# Patient Record
Sex: MALE
[De-identification: names, ages, dates, MRNs, and addresses within clinical notes are randomized per-mention and may not be internally consistent; named-entity substitution may affect disease eponyms.]

---

## 2019-03-15 PROBLEM — Z00.00 ENCOUNTER FOR PREVENTIVE HEALTH EXAMINATION: Status: ACTIVE | Noted: 2019-03-15

## 2019-03-25 ENCOUNTER — APPOINTMENT (OUTPATIENT)
Dept: ORTHOPEDIC SURGERY | Facility: CLINIC | Age: 54
End: 2019-03-25
Payer: COMMERCIAL

## 2019-03-25 VITALS
BODY MASS INDEX: 42.66 KG/M2 | WEIGHT: 315 LBS | HEIGHT: 72 IN | HEART RATE: 76 BPM | DIASTOLIC BLOOD PRESSURE: 78 MMHG | SYSTOLIC BLOOD PRESSURE: 155 MMHG

## 2019-03-25 DIAGNOSIS — Z86.39 PERSONAL HISTORY OF OTHER ENDOCRINE, NUTRITIONAL AND METABOLIC DISEASE: ICD-10-CM

## 2019-03-25 DIAGNOSIS — Z78.9 OTHER SPECIFIED HEALTH STATUS: ICD-10-CM

## 2019-03-25 DIAGNOSIS — Z83.3 FAMILY HISTORY OF DIABETES MELLITUS: ICD-10-CM

## 2019-03-25 PROCEDURE — 99204 OFFICE O/P NEW MOD 45 MIN: CPT

## 2019-03-25 PROCEDURE — 73564 X-RAY EXAM KNEE 4 OR MORE: CPT | Mod: LT

## 2019-03-25 RX ORDER — HYALURONATE SODIUM 20 MG/2 ML
20 SYRINGE (ML) INTRAARTICULAR
Qty: 1 | Refills: 0 | Status: ACTIVE | OUTPATIENT
Start: 2019-03-25

## 2019-03-25 RX ORDER — DICLOFENAC SODIUM 50 MG/1
50 TABLET, DELAYED RELEASE ORAL
Qty: 60 | Refills: 1 | Status: ACTIVE | COMMUNITY
Start: 2019-03-25 | End: 1900-01-01

## 2019-03-25 RX ORDER — METFORMIN HYDROCHLORIDE 625 MG/1
TABLET ORAL
Refills: 0 | Status: ACTIVE | COMMUNITY

## 2019-03-25 NOTE — HISTORY OF PRESENT ILLNESS
[de-identified] : CC Left knee\par \par HPI 55 yo male right HD presents with [chronic] onset of 3 months of constant pain in the posterior lateral and anterior Left knee [without injury]. The pain is same, and rated a 6 out of 10, described as sharp, with radiation to the he'll. [Rest] makes the pain better and [walking standing stairs] makes the pain worse. The patient reports associated symptoms of pop and click. The patient - pain at night affecting sleep, and - similar pain previously.\par \par The patient has tried the following treatments:\par Activity modification	+\par Ice/Compression  	+\par Braces    		-\par Nsaids    		+\par Physical Therapy 	-\par Cortisone Injection	-\par Visco Injection		-\par Arthroscopy		-\par \par Review of Systems is positive for the above musculoskeletal symptoms and is otherwise non-contributory for general, constitutional, psychiatric, neurologic, HEENT, cardiac, respiratory, gastrointestinal, reproductive, lymphatic, and dermatologic complaints.\par \par Consult by Dr Nicole Grossman\par \par

## 2019-03-25 NOTE — PHYSICAL EXAM
[de-identified] : Physical Examination\par General: well nourished, in no acute distress, alert and oriented to person, place and time\par Psychiatric: normal mood and affect, no abnormal movements or speech patterns\par Eyes: vision intact - glasses\par Throat: no thyromegaly\par Lymph: no enlarged nodes, no lymphedema in extremity\par Respiratory: no wheezing, no shortness of breath with ambulation\par Cardiac: no cardiac leg swelling, 2+ peripheral pulses\par Neurology: normal gross sensation in extremities to light touch\par Abdomen: soft, non-tender, tympanic, no masses\par \par Musculoskeletal Examination\par Ambulation	+ antalgic gait, - assistive devices\par \par Knee			Right			Left\par General\par      Swelling/Deformity	normal			normal	\par      Skin			normal			normal\par      Erythema		-			-\par      Standing Alignment	neutral			varus\par      Effusion		none			trace\par Range of Motion\par      Hip			full painless ROM		full painless ROM\par      Knee Flexion		100			70\par      Knee Extension	0			0\par Patella\par      J Sign		-			-\par      Quad Medial/Lateral	1/1 1/1\par      Apprehension		-			-\par      Jr's		-			+\par      Grind Sign		-			+\par      Crepitus		-			+\par Palpation\par      Medial Joint Line	-			+\par      Medial Fem Condyle	-			-\par      Lateral Joint Line	-			-\par      Quad Tendon		-			-\par      Patella Tendon	-			-\par      Medial Patella		-			-\par      Lateral Patella 	-			-\par      Posterior Knee	-			+\par Ligamentous\par      Varus @ 0° / 30°	-/-			-/-\par      Valgus @ 0° / 30°	-/-			-/-\par      Lachman		-			-\par      Pivot Shift		-			-\par      Anterior Drawer	-			-\par      Posterior Drawer	-			-\par Meniscus\par      Luis		-			+\par      Flexion Pinch		-			posterior pain\par Strength Examination/Atrophy\par      Hip Flexors 		5+			5+\par      Quadriceps		5+			5+\par      Hamstring		5+			5+\par      Tibialis Anterior	5+			5+\par      Achilles/Soleus	5+			5+\par Sensation\par      Deep Peroneal	normal			normal\par      Superficial Peroneal 	normal			normal\par      Sural  		normal			normal\par      Posterior Tibial 	normal			normal\par      Saphneous 		normal			normal\par Pulses\par      DP			2+			2+\par \par \par  [de-identified] : 5 views of the affected Left knee (standing AP, flexing standing AP, 30degree flexed lateral, 0degree lateral, sunrise view)\par were ordered, obtained and evaluated by myself today and\par demonstrate:\par There is severe medial weightbearing asymmetric narrowing\par Small to moderate osteophytic lipping\par Small suprapatellar effusion\par Mild patellofemoral joint space loss without evidence of tilt [or] subluxation on sunrise view\par Normal soft tissue density\par Otherwise normal osseous bone structure without fracture or dislocation

## 2019-04-22 ENCOUNTER — APPOINTMENT (OUTPATIENT)
Dept: ORTHOPEDIC SURGERY | Facility: CLINIC | Age: 54
End: 2019-04-22

## 2019-06-10 ENCOUNTER — APPOINTMENT (OUTPATIENT)
Dept: ORTHOPEDIC SURGERY | Facility: CLINIC | Age: 54
End: 2019-06-10
Payer: COMMERCIAL

## 2019-06-10 PROCEDURE — 20611 DRAIN/INJ JOINT/BURSA W/US: CPT | Mod: LT

## 2019-06-10 PROCEDURE — 99214 OFFICE O/P EST MOD 30 MIN: CPT | Mod: 25

## 2019-06-10 NOTE — PHYSICAL EXAM
[de-identified] : 5 views of the affected Left knee (standing AP, flexing standing AP, 30degree flexed lateral, 0degree lateral, sunrise view)\par 3-2019\par demonstrate:\par There is severe medial weightbearing asymmetric narrowing\par Small to moderate osteophytic lipping\par Small suprapatellar effusion\par Mild patellofemoral joint space loss without evidence of tilt [or] subluxation on sunrise view\par Normal soft tissue density\par Otherwise normal osseous bone structure without fracture or dislocation\par \par \par MRI Left knee from [facility] on 4-8-19\par My impression of the images:\par Quality of the MRI is poor\par Medial Meniscus extruded and degenerative tear\par Lateral Meniscus ok\par There is severe chondral loss in the medial greater than patellofemoral greater than compartments\par There is bone marrow edema[/subchondral cysts] in the medial and patellofemoral compartments\par LCL is intact\par MCL is intact\par ACL is intact\par PCL is intact \par Quadriceps Tendon is intact\par Patella Tendon is intact\par \par The Final Radiologist Impression:\par There is moderate to severe medial compartment arthrosis with severe joint space narrowing, broad area of full-thickness articular cartilage loss of the central weightbearing surface of the femoral condyle and tibial plateau with reactive marrow edema and marginal osteophytes. The medial meniscus is diffusely degenerated and torn and there is extrusion of the body segment.\par The lateral meniscus is intact without degenerative signal. There is mucoid degeneration of the anterior and posterior cruciate ligaments. The medial and lateral collateral ligament complex are intact. The extensor mechanism is intact.\par There is a lobulated intraosseous ganglion within the femoral attachment of the anterior cruciate ligament. There is heterogeneous marrow signal within the distal femur without infiltrative lesion. The articular cartilage of the lateral and patellofemoral compartments is maintained. There is a small joint effusion. There is no significant popliteal cyst. The para-articular musculature is unremarkable. There is subcutaneous soft tissue edema.\par IMPRESSION:\par Moderate to severe medial compartment arthrosis with diffusely degenerated and torn medial meniscus and extrusion of the body segment.\par \par  [de-identified] : Physical Examination\par General: well nourished, in no acute distress, alert and oriented to person, place and time\par Psychiatric: normal mood and affect, no abnormal movements or speech patterns\par Eyes: vision intact - glasses\par Throat: no thyromegaly\par Lymph: no enlarged nodes, no lymphedema in extremity\par Respiratory: no wheezing, no shortness of breath with ambulation\par Cardiac: no cardiac leg swelling, 2+ peripheral pulses\par Neurology: normal gross sensation in extremities to light touch\par Abdomen: soft, non-tender, tympanic, no masses\par \par Musculoskeletal Examination\par Ambulation	+ antalgic gait, - assistive devices\par \par Knee			Right			Left\par General\par      Swelling/Deformity	normal			normal	\par      Skin			normal			normal\par      Erythema		-			-\par      Standing Alignment	neutral			varus\par      Effusion		none			trace\par Range of Motion\par      Hip			full painless ROM		full painless ROM\par      Knee Flexion		100			70\par      Knee Extension	0			0\par Patella\par      J Sign		-			-\par      Quad Medial/Lateral	1/1 1/1\par      Apprehension		-			-\par      Jr's		-			+\par      Grind Sign		-			+\par      Crepitus		-			+\par Palpation\par      Medial Joint Line	-			+\par      Medial Fem Condyle	-			-\par      Lateral Joint Line	-			-\par      Quad Tendon		-			-\par      Patella Tendon	-			-\par      Medial Patella		-			-\par      Lateral Patella 	-			-\par      Posterior Knee	-			+\par Ligamentous\par      Varus @ 0° / 30°	-/-			-/-\par      Valgus @ 0° / 30°	-/-			-/-\par      Lachman		-			-\par      Pivot Shift		-			-\par      Anterior Drawer	-			-\par      Posterior Drawer	-			-\par Meniscus\par      Luis		-			+\par      Flexion Pinch		-			posterior pain\par Strength Examination/Atrophy\par      Hip Flexors 		5+			5+\par      Quadriceps		5+			5+\par      Hamstring		5+			5+\par      Tibialis Anterior	5+			5+\par      Achilles/Soleus	5+			5+\par Sensation\par      Deep Peroneal	normal			normal\par      Superficial Peroneal 	normal			normal\par      Sural  		normal			normal\par      Posterior Tibial 	normal			normal\par      Saphneous 		normal			normal\par Pulses\par      DP			2+			2+\par \par \par

## 2019-06-10 NOTE — HISTORY OF PRESENT ILLNESS
[de-identified] : CC Left knee\par \par HPI 55 yo male right HD presents for MRI FU of [chronic] onset of 3 months of constant pain in the posterior lateral and anterior Left knee [without injury]. The pain is same, and rated a 6 out of 10, described as sharp, with radiation to the he'll. [Rest] makes the pain better and [walking standing stairs] makes the pain worse. The patient reports associated symptoms of pop and click. The patient - pain at night affecting sleep, and - similar pain previously.\par \par The patient has tried the following treatments:\par Activity modification	+\par Ice/Compression  	+\par Braces    		-\par Nsaids    		+\par Physical Therapy 	+\par Cortisone Injection	-\par Visco Injection		not covered by insurance\par Arthroscopy		-\par \par Review of Systems is positive for the above musculoskeletal symptoms and is otherwise non-contributory for general, constitutional, psychiatric, neurologic, HEENT, cardiac, respiratory, gastrointestinal, reproductive, lymphatic, and dermatologic complaints.\par \par Consult by Dr Nicole Grossman\par \par

## 2019-06-10 NOTE — DISCUSSION/SUMMARY
[de-identified] : Left knee osteoarthritis\par Complex medial meniscus tear with extrusion\par \par The patient and I discussed the causes and progression of degenerative joint disease of the knee. Models, diagrams and drawings were used in the discussion. Treatment can include conservative non-operative management and surgical options. Conservative management includes weight loss, activity modification, physical therapy to improve motion and strength in the muscles around the knee and the body's core, PO and topical NSAIDs, corticosteroid and/or viscosupplementation intra-articular injections. If the patient fails to improve with non-operative management, surgical management is possible. Depending upon the patient's age, BMI, activity level, degree and location of arthrosis different surgical options are possible including arthroscopic debridement with chondroplasty, high-tibial osteotomy, unicondylar/partial arthroplasty, and total joint arthroplasty.\par \par Physical therapy was prescribed for knee ROM exercises, strengthening exercises, deep tissue massage, core strengthening, hip abductor strengthening, VMO strengthening, modalities PRN, and home exercises\par \par Procedure Note:\par \par Verbal consent was obtained for an arthrocentesis and intra-articular ketorolac injection of the Left knee, after the risks and benefits were discussed with the patient. Potential adverse effects were discussed including but not limited to bleeding, skin/joint infection, local skin reactions including bleaching, bruising, stiffness, soreness, vasovagal episodes, transient hyperglycemia, avascular necrosis, pseudo-septic type reactions, post injection joint pain, allergic reaction to product or anesthetic and other rare but potential adverse effects along with benefits including decreased pain and improved stability prior to obtaining verbal informed consent. It was also discussed that for some patients the treatment is ineffective and there are no guarantees that the patient will experience improvement as the result of the injection. In rare occasions the injection can cause worsening of pain.\par \par The use of a Sonosite 15-6 MHz linear transducer with live ultrasound guidance of the knee was necessary given the patient's BMI and local body habitus overlying and obscuring the accurate identification of normal body bony anatomy used to identify the injection site and the depth of soft tissue envelope necessitating a longer than normal needle to reach the joint space, and to confirm the location of the needle tip and intra-articular delivery of the medication. Without the use of live ultrasound guidance the injection would have been more difficult and place the patient's neurovascular structures at risk from the longer needle needed to traverse the soft tissue envelope.\par \par A 6 inch bolster was placed underneath the knee to place the knee in a slightly flexed position. The superolateral injection site was identified using the ultrasound probe to identify the suprapatellar space and superior boarder of the patella first longitudinally and then transversely. The injection site was marked and prepped with a ChloraPrep swab and anesthetized with ethylchloride skin anesthesia. Using sterile technique a 20g 3 1/2 in spinal needle with 4 cc total of 1cc 1% lidocaine without epinephrine, 1cc 0.25% Marcaine without epinephrine and 2cc of 60mg/mL Ketorolac was passed through the injection site towards the suprapatellar space under live ultrasound guidance and noted to penetrate the joint capsule. The medication was injected without resistance under live ultrasound visualization and noted to flow into the suprapatellar joint space. The injection site was sterilely dressed, there was minimal blood loss. The patient tolerated this procedure without any complications done by myself. Images were recorded and saved.\par \par The patient has been advised that if they notice any worsening of symptoms or any problems to contact me and seek care from a qualified medical professional. The patient was instructed to ice the knee and take NSAID medication on an as needed basis if the patient feels discomfort.\par \par Due to failure of prior non-operative modalities of treatment including physical therapy, activity modification, non-steroidal anti-inflammatory medications, and weight-loss with inability to give corticosteroid injection due to the patient's diabetic status over concern for unsafe rise in blood glucose causing injury to the patient, I am ordering a viscosupplementation injection Zilretta left knee and will obtain insurance authorization. The patient will be contacted by our authorization department over its status, copayment and when available for injection.\par Patient has an MRI approved by his PMD, I recommend he continues and obtains the PMD ordered mri and followup afterwards with a CD for my evaluation of the images\par \par The patient verifies their understanding the the visit, diagnosis and plan. They agree with the treatment plan and will contact the office with any questions or problems.\par Follow up\par PRN

## 2019-07-08 ENCOUNTER — APPOINTMENT (OUTPATIENT)
Dept: ORTHOPEDIC SURGERY | Facility: CLINIC | Age: 54
End: 2019-07-08
Payer: COMMERCIAL

## 2019-07-08 PROCEDURE — 20611 DRAIN/INJ JOINT/BURSA W/US: CPT | Mod: LT

## 2019-07-08 NOTE — PROCEDURE
[de-identified] : Chief complaint:     Left knee pain\par \par Diagnosis:              Left knee osteoarthritis\par \par \par Injection:\par Zilretta\par 262104 expiration 10-20\par \par \par Procedure Note:\par \par Risks and benefits of an arthrocentesis and intraarticular delayed release cortisone injection of the LEFT knee were discussed with the patient. Potential adverse effects were discussed including but not limited to bleeding, skin/ joint infection, local skin reactions including bleaching, bruising, stiffness, soreness, vasovagal episodes, transient hyperglycemia, avascular necrosis, pseudo-septic type reactions, post injection joint pain, allergic reaction to product or anesthetic and other rare but potential adverse effects along with benefits including decreased pain and improved stability prior to obtaining verbal informed consent. It was also discussed that for some patients the treatment is ineffective and there are no guarantees that the patient will experience improvement as the result of the injection. In rare occasions the injection can cause worsening of pain.\par \par The use of a Sonosite 15-6 MHz linear transducer with live ultrasound guidance of the knee was necessary given the patient's BMI and local body habitus overlying and obscuring the accurate identification of normal body bony anatomy used to identify the injection site and the depth of soft tissue envelope necessitating a longer than normal needle to reach the joint space, and to confirm the location of the needle tip and intra-articular delivery of the medication. Without the use of live ultrasound guidance the injection would have been more difficult and place the patient's neurovascular structures at risk from the longer needle needed to traverse the soft tissue envelope.\par \par A 6 inch bolster was placed underneath the knee to place the LEFT knee in a slightly flexed position. The superolateral injection site was identified using the ultrasound probe to identify the suprapatellar space and superior boarder of the patella first longitudinally and then transversely. The injection site was marked and prepped with a ChloraPrep swab and anesthetized with ethylchloride skin anesthesia. Under sterile technique a 20g 3 1/2 in spinal needle with a preloaded prepared syringe was passed through the injection site towards the suprapatellar space under live ultrasound guidance and noted to penetrate the joint capsule. The medication was injected without resistance under live ultrasound visualization and noted to flow into the suprapatellar joint space. The injection site was sterilely dressed, there was minimal blood loss.\par \par The patient tolerated this procedure without any complications done by myself. Images were recorded and saved.\par \par The patient has been advised that if they notice any worsening of symptoms or any problems to contact me and seek care from a qualified medical professional. The patient was instructed to ice the knee and take NSAID medication on an as needed basis if the patient feels discomfort.\par \par Followup injection p.r.n.

## 2019-10-14 ENCOUNTER — OTHER (OUTPATIENT)
Age: 54
End: 2019-10-14

## 2019-10-14 ENCOUNTER — APPOINTMENT (OUTPATIENT)
Dept: ORTHOPEDIC SURGERY | Facility: CLINIC | Age: 54
End: 2019-10-14

## 2019-10-14 RX ORDER — DICLOFENAC SODIUM 50 MG/1
50 TABLET, DELAYED RELEASE ORAL
Qty: 28 | Refills: 0 | Status: ACTIVE | COMMUNITY
Start: 2019-10-14 | End: 1900-01-01

## 2019-10-21 ENCOUNTER — APPOINTMENT (OUTPATIENT)
Dept: ORTHOPEDIC SURGERY | Facility: CLINIC | Age: 54
End: 2019-10-21
Payer: COMMERCIAL

## 2019-10-21 PROCEDURE — 99214 OFFICE O/P EST MOD 30 MIN: CPT

## 2019-10-21 PROCEDURE — 73564 X-RAY EXAM KNEE 4 OR MORE: CPT | Mod: LT

## 2019-10-21 RX ORDER — DICLOFENAC SODIUM 50 MG/1
50 TABLET, DELAYED RELEASE ORAL
Qty: 30 | Refills: 1 | Status: ACTIVE | COMMUNITY
Start: 2019-10-21 | End: 1900-01-01

## 2019-10-21 NOTE — PHYSICAL EXAM
[de-identified] : Physical Examination\par General: well nourished, in no acute distress, alert and oriented to person, place and time\par Psychiatric: normal mood and affect, no abnormal movements or speech patterns\par Eyes: vision intact - glasses\par Throat: no thyromegaly\par Lymph: no enlarged nodes, no lymphedema in extremity\par Respiratory: no wheezing, no shortness of breath with ambulation\par Cardiac: no cardiac leg swelling, 2+ peripheral pulses\par Neurology: normal gross sensation in extremities to light touch\par Abdomen: soft, non-tender, tympanic, no masses\par \par Musculoskeletal Examination\par Ambulation	+ antalgic gait, - assistive devices\par \par Knee			Right			Left\par General\par      Swelling/Deformity	normal			normal	\par      Skin			normal			normal\par      Erythema		-			-\par      Standing Alignment	neutral			varus\par      Effusion		none			trace\par Range of Motion\par      Hip			full painless ROM		full painless ROM\par      Knee Flexion		100			95\par      Knee Extension	                 0			0\par Patella\par      J Sign		-			-\par      Quad Medial/Lateral	1/1 1/1\par      Apprehension		-			-\par      Jr's		                -			+\par      Grind Sign		-			+\par      Crepitus		-			+\par Palpation\par      Medial Joint Line	                 -			+\par      Medial Fem Condyle	-			-\par      Lateral Joint Line	-			-\par      Quad Tendon		-			-\par      Patella Tendon	               -			-\par      Medial Patella		-			-\par      Lateral Patella 	                  -			-\par      Posterior Knee	                -			+\par Ligamentous\par      Varus @ 0° / 30°	-/-			-/-\par      Valgus @ 0° / 30°	-/-			-/-\par      Lachman		-			-\par      Pivot Shift		-			-\par      Anterior Drawer           	-			-\par      Posterior Drawer	-			-\par Meniscus\par      Luis		-			+\par      Flexion Pinch		-			posterior pain\par Strength Examination/Atrophy\par      Hip Flexors 		5+			5+\par      Quadriceps		5+			5+\par      Hamstring		5+			5+\par      Tibialis Anterior	                5+			5+\par      Achilles/Soleus	               5+			5+\par Sensation\par      Deep Peroneal	normal			normal\par      Superficial Peroneal 	normal			normal\par      Sural  		normal			normal\par      Posterior Tibial 	normal			normal\par      Saphneous 		normal			normal\par Pulses\par      DP			2+			2+\par \par \par  [de-identified] : 5 views of the affected Left knee (standing AP, flexing standing AP, 30degree flexed lateral, 0degree lateral, sunrise view)\par 3-2019\par demonstrate:\par There is severe medial weightbearing asymmetric narrowing\par Small to moderate osteophytic lipping\par Small suprapatellar effusion\par Mild patellofemoral joint space loss without evidence of tilt [or] subluxation on sunrise view\par Normal soft tissue density\par Otherwise normal osseous bone structure without fracture or dislocation\par \par \par MRI Left knee from [facility] on 4-8-19\par My impression of the images:\par Quality of the MRI is poor\par Medial Meniscus extruded and degenerative tear\par Lateral Meniscus ok\par There is severe chondral loss in the medial greater than patellofemoral greater than compartments\par There is bone marrow edema[/subchondral cysts] in the medial and patellofemoral compartments\par LCL is intact\par MCL is intact\par ACL is intact\par PCL is intact \par Quadriceps Tendon is intact\par Patella Tendon is intact\par \par The Final Radiologist Impression:\par There is moderate to severe medial compartment arthrosis with severe joint space narrowing, broad area of full-thickness articular cartilage loss of the central weightbearing surface of the femoral condyle and tibial plateau with reactive marrow edema and marginal osteophytes. The medial meniscus is diffusely degenerated and torn and there is extrusion of the body segment.\par The lateral meniscus is intact without degenerative signal. There is mucoid degeneration of the anterior and posterior cruciate ligaments. The medial and lateral collateral ligament complex are intact. The extensor mechanism is intact.\par There is a lobulated intraosseous ganglion within the femoral attachment of the anterior cruciate ligament. There is heterogeneous marrow signal within the distal femur without infiltrative lesion. The articular cartilage of the lateral and patellofemoral compartments is maintained. There is a small joint effusion. There is no significant popliteal cyst. The para-articular musculature is unremarkable. There is subcutaneous soft tissue edema.\par IMPRESSION:\par Moderate to severe medial compartment arthrosis with diffusely degenerated and torn medial meniscus and extrusion of the body segment.\par \par 5 views of the affected Left knee (standing AP, flexing standing AP, 30degree flexed lateral, 0degree lateral, sunrise view)\par were ordered, obtained and evaluated by myself today and\par demonstrate:\par There is severe medial weightbearing asymmetric narrowing\par Small to moderate osteophytic lipping\par Small suprapatellar effusion\par Mild patellofemoral joint space loss without evidence of tilt [or] subluxation on sunrise view\par Normal soft tissue density\par Otherwise normal osseous bone structure without fracture or dislocation\par

## 2019-10-21 NOTE — HISTORY OF PRESENT ILLNESS
[de-identified] : CC Left knee\par \par HPI 55 yo male right HD presents for 3 month Zilretta Injection FU of [chronic] onset of 3 months of constant pain in the posterior lateral and anterior Left knee [without injury]. The pain is same, and rated a 6 out of 10, described as sharp, with radiation to the heel. [Rest] makes the pain better and [walking standing stairs] makes the pain worse. The patient reports associated symptoms of pop and click. The patient - pain at night affecting sleep, and - similar pain previously.\par \par The patient has tried the following treatments:\par Activity modification	+\par Ice/Compression  	+\par Braces    		-\par Nsaids    		+ Diclofenac some relief\par Physical Therapy 	+ \par Cortisone Injection	+ Zilretta  3 month 100%\par Visco Injection		not covered by insurance\par Arthroscopy		-\par \par wants more zilretta\par \par Review of Systems is positive for the above musculoskeletal symptoms and is otherwise non-contributory for general, constitutional, psychiatric, neurologic, HEENT, cardiac, respiratory, gastrointestinal, reproductive, lymphatic, and dermatologic complaints.\par \par Consult by Dr Nicole Grossman\par \par

## 2019-10-21 NOTE — DISCUSSION/SUMMARY
[de-identified] : Left knee osteoarthritis\par Complex medial meniscus tear with extrusion\par \par The patient and I discussed the causes and progression of degenerative joint disease of the knee. Models, diagrams and drawings were used in the discussion. Treatment can include conservative non-operative management and surgical options. Conservative management includes weight loss, activity modification, physical therapy to improve motion and strength in the muscles around the knee and the body's core, PO and topical NSAIDs, corticosteroid and/or viscosupplementation intra-articular injections. If the patient fails to improve with non-operative management, surgical management is possible. Depending upon the patient's age, BMI, activity level, degree and location of arthrosis different surgical options are possible including arthroscopic debridement with chondroplasty, high-tibial osteotomy, unicondylar/partial arthroplasty, and total joint arthroplasty.\par \par The patient was prescribed Diclofenac PO non-steroidal anti-inflammatory medication. 50mg tablets twice daily to be taken for at least 1-2 weeks in a row and then PRN afterwards. Risks and benefits were discussed and include but not limited to renal damage and GI ulceration and bleeding.  They were advised to take with food to limit stomach upset as well as warned to stop the medication if worsening gastric pain or dizziness or other side effects. Also to immediately stop the medication and seek appropriate medical attention if any severe stomach ache, gastritis, black/red vomit, black/red stools or any other medical concern.\par \par Due to failure of prior non-operative modalities of treatment including physical therapy, activity modification, non-steroidal anti-inflammatory medications, and weight-loss with inability to give corticosteroid injection due to the patient's diabetic status over concern for unsafe rise in blood glucose causing injury to the patient, I am ordering a viscosupplementation injection Zilretta left knee and will obtain insurance authorization. The patient will be contacted by our authorization department over its status, copayment and when available for injection.\par Patient has an MRI approved by his PMD, I recommend he continues and obtains the PMD ordered mri and followup afterwards with a CD for my evaluation of the images\par \par The patient verifies their understanding the the visit, diagnosis and plan. They agree with the treatment plan and will contact the office with any questions or problems.\par Follow up\par 4 injection

## 2019-11-04 ENCOUNTER — APPOINTMENT (OUTPATIENT)
Dept: ORTHOPEDIC SURGERY | Facility: CLINIC | Age: 54
End: 2019-11-04
Payer: COMMERCIAL

## 2019-11-04 PROCEDURE — 20611 DRAIN/INJ JOINT/BURSA W/US: CPT | Mod: LT

## 2019-11-04 NOTE — PROCEDURE
[de-identified] : Chief complaint:     Left  knee pain\par \par Diagnosis:              Left knee osteoarthritis\par \par \par Injection:\par Zilretta x1\par [id number] expiration 5-21\par \par \par Procedure Note:\par \par Risks and benefits of an arthrocentesis and intraarticular extended release cortisone injection of the Left  knee were discussed with the patient. Potential adverse effects were discussed including but not limited to bleeding, skin/ joint infection, local skin reactions including bleaching, bruising, stiffness, soreness, vasovagal episodes, transient hyperglycemia, avascular necrosis, pseudo-septic type reactions, post injection joint pain, elevation of blood glucose, allergic reaction to product or anesthetic and other rare but potential adverse effects along with benefits including decreased pain and improved stability prior to obtaining verbal informed consent. It was also discussed that for some patients the treatment is ineffective and there are no guarantees that the patient will experience improvement as the result of the injection. In rare occasions the injection can cause worsening of pain.\par \par Zilretta vials were preparred according to instructions and drawn up into a 5cc syringe.\par \par The use of a Sonosite 15-6 MHz linear transducer with live ultrasound guidance of the knee was necessary given the patient's BMI and local body habitus overlying and obscuring the accurate identification of normal body bony anatomy used to identify the injection site and the depth of soft tissue envelope necessitating a longer than normal needle to reach the joint space, and to confirm the location of the needle tip and intra-articular delivery of the medication. Without the use of live ultrasound guidance the injection would have been more difficult and place the patient's neurovascular structures at risk from the longer needle needed to traverse the soft tissue envelope.\par \par A 6 inch bolster was placed underneath the knee to place the LEFT knee in a slightly flexed position. The superolateral injection site was identified using the ultrasound probe to identify the suprapatellar space and superior boarder of the patella first longitudinally and then transversely. The injection site was marked and prepped with a ChloraPrep swab and anesthetized with ethylchloride skin anesthesia. Under sterile technique a 20g 3 1/2 in spinal needle with a preloaded Zilretta syringe was passed through the injection site towards the suprapatellar space under live ultrasound guidance and noted to penetrate the joint capsule. The medication was injected without resistance under live ultrasound visualization and noted to flow into the suprapatellar joint space. The injection site was sterilely dressed, there was minimal blood loss.\par \par The patient tolerated this procedure without any complications done by myself. Images were recorded and saved.\par \par The patient has been advised that if they notice any worsening of symptoms or any problems to contact me and seek care from a qualified medical professional. The patient was instructed to ice the knee and take NSAID medication on an as needed basis if the patient feels discomfort.\par \par Followup injection [6 months]

## 2020-02-24 ENCOUNTER — APPOINTMENT (OUTPATIENT)
Dept: ORTHOPEDIC SURGERY | Facility: CLINIC | Age: 55
End: 2020-02-24
Payer: COMMERCIAL

## 2020-02-24 PROCEDURE — 99213 OFFICE O/P EST LOW 20 MIN: CPT

## 2020-02-24 NOTE — DISCUSSION/SUMMARY
[de-identified] : Left knee osteoarthritis\par Complex medial meniscus tear with extrusion\par \par The patient and I discussed the causes and progression of degenerative joint disease of the knee. Models, diagrams and drawings were used in the discussion. Treatment can include conservative non-operative management and surgical options. Conservative management includes weight loss, activity modification, physical therapy to improve motion and strength in the muscles around the knee and the body's core, PO and topical NSAIDs, corticosteroid and/or viscosupplementation intra-articular injections. If the patient fails to improve with non-operative management, surgical management is possible. Depending upon the patient's age, BMI, activity level, degree and location of arthrosis different surgical options are possible including arthroscopic debridement with chondroplasty, high-tibial osteotomy, unicondylar/partial arthroplasty, and total joint arthroplasty.\par \par Due to failure of prior non-operative modalities of treatment including physical therapy, activity modification, non-steroidal anti-inflammatory medications, and weight-loss with inability to give corticosteroid injection due to the patient's diabetic status over concern for unsafe rise in blood glucose causing injury to the patient, I am ordering a viscosupplementation injection Zilretta left knee and will obtain insurance authorization. The patient will be contacted by our authorization department over its status, copayment and when available for injection.\par Patient has an MRI approved by his PMD, I recommend he continues and obtains the PMD ordered mri and followup afterwards with a CD for my evaluation of the images\par \par The patient verifies their understanding the the visit, diagnosis and plan. They agree with the treatment plan and will contact the office with any questions or problems.\par \par Follow up\par 4 injection

## 2020-02-24 NOTE — PHYSICAL EXAM
[de-identified] : Physical Examination\par General: well nourished, in no acute distress, alert and oriented to person, place and time\par Psychiatric: normal mood and affect, no abnormal movements or speech patterns\par Eyes: vision intact - glasses\par Throat: no thyromegaly\par Lymph: no enlarged nodes, no lymphedema in extremity\par Respiratory: no wheezing, no shortness of breath with ambulation\par Cardiac: no cardiac leg swelling, 2+ peripheral pulses\par Neurology: normal gross sensation in extremities to light touch\par Abdomen: soft, non-tender, tympanic, no masses\par \par Musculoskeletal Examination\par Ambulation	+ antalgic gait, - assistive devices\par \par Knee			Right			Left\par General\par      Swelling/Deformity	normal			normal	\par      Skin			normal			normal\par      Erythema		-			-\par      Standing Alignment	neutral			varus\par      Effusion		none			trace\par Range of Motion\par      Hip			full painless ROM		full painless ROM\par      Knee Flexion		100			95\par      Knee Extension	                 0			0\par Patella\par      J Sign		-			-\par      Quad Medial/Lateral	1/1 1/1\par      Apprehension		-			-\par      Jr's		                -			+\par      Grind Sign		-			+\par      Crepitus		-			+\par Palpation\par      Medial Joint Line	                 -			+\par      Medial Fem Condyle	-			-\par      Lateral Joint Line	-			-\par      Quad Tendon		-			-\par      Patella Tendon	               -			-\par      Medial Patella		-			-\par      Lateral Patella 	                  -			-\par      Posterior Knee	                -			+\par Ligamentous\par      Varus @ 0° / 30°	-/-			-/-\par      Valgus @ 0° / 30°	-/-			-/-\par      Lachman		-			-\par      Pivot Shift		-			-\par      Anterior Drawer           	-			-\par      Posterior Drawer	-			-\par Meniscus\par      Luis		-			+\par      Flexion Pinch		-			posterior pain\par Strength Examination/Atrophy\par      Hip Flexors 		5+			5+\par      Quadriceps		5+			5+\par      Hamstring		5+			5+\par      Tibialis Anterior	                5+			5+\par      Achilles/Soleus	               5+			5+\par Sensation\par      Deep Peroneal	normal			normal\par      Superficial Peroneal 	normal			normal\par      Sural  		normal			normal\par      Posterior Tibial 	normal			normal\par      Saphneous 		normal			normal\par Pulses\par      DP			2+			2+\par \par \par  [de-identified] : 5 views of the affected Left knee (standing AP, flexing standing AP, 30degree flexed lateral, 0degree lateral, sunrise view)\par 3-2019\par demonstrate:\par There is severe medial weightbearing asymmetric narrowing\par Small to moderate osteophytic lipping\par Small suprapatellar effusion\par Mild patellofemoral joint space loss without evidence of tilt [or] subluxation on sunrise view\par Normal soft tissue density\par Otherwise normal osseous bone structure without fracture or dislocation\par \par \par MRI Left knee from [facility] on 4-8-19\par My impression of the images:\par Quality of the MRI is poor\par Medial Meniscus extruded and degenerative tear\par Lateral Meniscus ok\par There is severe chondral loss in the medial greater than patellofemoral greater than compartments\par There is bone marrow edema[/subchondral cysts] in the medial and patellofemoral compartments\par LCL is intact\par MCL is intact\par ACL is intact\par PCL is intact \par Quadriceps Tendon is intact\par Patella Tendon is intact\par \par The Final Radiologist Impression:\par There is moderate to severe medial compartment arthrosis with severe joint space narrowing, broad area of full-thickness articular cartilage loss of the central weightbearing surface of the femoral condyle and tibial plateau with reactive marrow edema and marginal osteophytes. The medial meniscus is diffusely degenerated and torn and there is extrusion of the body segment.\par The lateral meniscus is intact without degenerative signal. There is mucoid degeneration of the anterior and posterior cruciate ligaments. The medial and lateral collateral ligament complex are intact. The extensor mechanism is intact.\par There is a lobulated intraosseous ganglion within the femoral attachment of the anterior cruciate ligament. There is heterogeneous marrow signal within the distal femur without infiltrative lesion. The articular cartilage of the lateral and patellofemoral compartments is maintained. There is a small joint effusion. There is no significant popliteal cyst. The para-articular musculature is unremarkable. There is subcutaneous soft tissue edema.\par IMPRESSION:\par Moderate to severe medial compartment arthrosis with diffusely degenerated and torn medial meniscus and extrusion of the body segment.\par \par 5 views of the affected Left knee (standing AP, flexing standing AP, 30degree flexed lateral, 0degree lateral, sunrise view)\par 10-21-19\par demonstrate:\par There is severe medial weightbearing asymmetric narrowing\par Small to moderate osteophytic lipping\par Small suprapatellar effusion\par Mild patellofemoral joint space loss without evidence of tilt [or] subluxation on sunrise view\par Normal soft tissue density\par Otherwise normal osseous bone structure without fracture or dislocation\par

## 2020-02-24 NOTE — HISTORY OF PRESENT ILLNESS
[de-identified] : CC Left knee\par \par HPI 55 yo male right HD presents for 3 month Zilretta Injection FU of [chronic] onset of 3 months of constant pain in the posterior lateral and anterior Left knee [without injury]. The pain is same, and rated a 6 out of 10, described as sharp, with radiation to the heel. [Rest] makes the pain better and [walking standing stairs] makes the pain worse. The patient reports associated symptoms of pop and click. The patient - pain at night affecting sleep, and - similar pain previously.\par \par The patient has tried the following treatments:\par Activity modification	+\par Ice/Compression  	+\par Braces    		-\par Nsaids    		+ Diclofenac some relief\par Physical Therapy 	+ \par Cortisone Injection	+ Zilretta  10/2019 3 month 100%\par Visco Injection		not covered by insurance\par Arthroscopy		-\par \par wants more zilretta\par \par Review of Systems is positive for the above musculoskeletal symptoms and is otherwise non-contributory for general, constitutional, psychiatric, neurologic, HEENT, cardiac, respiratory, gastrointestinal, reproductive, lymphatic, and dermatologic complaints.\par \par Consult by Dr Nicole Grossman\par \par

## 2020-03-09 ENCOUNTER — APPOINTMENT (OUTPATIENT)
Dept: ORTHOPEDIC SURGERY | Facility: CLINIC | Age: 55
End: 2020-03-09
Payer: COMMERCIAL

## 2020-03-09 PROCEDURE — 20611 DRAIN/INJ JOINT/BURSA W/US: CPT | Mod: LT

## 2020-03-09 NOTE — PROCEDURE
[de-identified] : Chief complaint:     Left  knee pain\par \par Diagnosis:              Left knee osteoarthritis\par \par \par Injection:\par Zilretta x1\par [id number] expiration 5-21\par \par \par Procedure Note:\par \par Risks and benefits of an arthrocentesis and intraarticular extended release cortisone injection of the Left  knee were discussed with the patient. Potential adverse effects were discussed including but not limited to bleeding, skin/ joint infection, local skin reactions including bleaching, bruising, stiffness, soreness, vasovagal episodes, transient hyperglycemia, avascular necrosis, pseudo-septic type reactions, post injection joint pain, elevation of blood glucose, allergic reaction to product or anesthetic and other rare but potential adverse effects along with benefits including decreased pain and improved stability prior to obtaining verbal informed consent. It was also discussed that for some patients the treatment is ineffective and there are no guarantees that the patient will experience improvement as the result of the injection. In rare occasions the injection can cause worsening of pain.\par \par Zilretta vials were preparred according to instructions and drawn up into a 5cc syringe.\par \par The use of a Sonosite 15-6 MHz linear transducer with live ultrasound guidance of the knee was necessary given the patient's BMI and local body habitus overlying and obscuring the accurate identification of normal body bony anatomy used to identify the injection site and the depth of soft tissue envelope necessitating a longer than normal needle to reach the joint space, and to confirm the location of the needle tip and intra-articular delivery of the medication. Without the use of live ultrasound guidance the injection would have been more difficult and place the patient's neurovascular structures at risk from the longer needle needed to traverse the soft tissue envelope.\par \par A 6 inch bolster was placed underneath the knee to place the LEFT knee in a slightly flexed position. The superolateral injection site was identified using the ultrasound probe to identify the suprapatellar space and superior boarder of the patella first longitudinally and then transversely. The injection site was marked and prepped with a ChloraPrep swab and anesthetized with ethylchloride skin anesthesia. Under sterile technique a 20g 3 1/2 in spinal needle with a preloaded Zilretta syringe was passed through the injection site towards the suprapatellar space under live ultrasound guidance and noted to penetrate the joint capsule. The medication was injected without resistance under live ultrasound visualization and noted to flow into the suprapatellar joint space. The injection site was sterilely dressed, there was minimal blood loss.\par \par The patient tolerated this procedure without any complications done by myself. Images were recorded and saved.\par \par The patient has been advised that if they notice any worsening of symptoms or any problems to contact me and seek care from a qualified medical professional. The patient was instructed to ice the knee and take NSAID medication on an as needed basis if the patient feels discomfort.\par \par Followup injection [6 months]

## 2020-07-21 ENCOUNTER — APPOINTMENT (OUTPATIENT)
Dept: ORTHOPEDIC SURGERY | Facility: CLINIC | Age: 55
End: 2020-07-21
Payer: COMMERCIAL

## 2020-07-21 VITALS
HEART RATE: 90 BPM | WEIGHT: 315 LBS | SYSTOLIC BLOOD PRESSURE: 148 MMHG | HEIGHT: 72 IN | TEMPERATURE: 97.6 F | BODY MASS INDEX: 42.66 KG/M2 | DIASTOLIC BLOOD PRESSURE: 90 MMHG

## 2020-07-21 PROCEDURE — 20611 DRAIN/INJ JOINT/BURSA W/US: CPT | Mod: LT

## 2020-07-21 NOTE — PROCEDURE
[de-identified] : Chief complaint:     Left  knee pain\par \par Diagnosis:              Left knee osteoarthritis\par \par \par Injection:\par Zilretta x1\par [id number] expiration 5-21\par \par \par Procedure Note:\par \par Risks and benefits of an arthrocentesis and intraarticular extended release cortisone injection of the Left  knee were discussed with the patient. Potential adverse effects were discussed including but not limited to bleeding, skin/ joint infection, local skin reactions including bleaching, bruising, stiffness, soreness, vasovagal episodes, transient hyperglycemia, avascular necrosis, pseudo-septic type reactions, post injection joint pain, elevation of blood glucose, allergic reaction to product or anesthetic and other rare but potential adverse effects along with benefits including decreased pain and improved stability prior to obtaining verbal informed consent. It was also discussed that for some patients the treatment is ineffective and there are no guarantees that the patient will experience improvement as the result of the injection. In rare occasions the injection can cause worsening of pain.\par \par Zilretta vials were preparred according to instructions and drawn up into a 5cc syringe.\par \par The use of a Sonosite 15-6 MHz linear transducer with live ultrasound guidance of the knee was necessary given the patient's BMI and local body habitus overlying and obscuring the accurate identification of normal body bony anatomy used to identify the injection site and the depth of soft tissue envelope necessitating a longer than normal needle to reach the joint space, and to confirm the location of the needle tip and intra-articular delivery of the medication. Without the use of live ultrasound guidance the injection would have been more difficult and place the patient's neurovascular structures at risk from the longer needle needed to traverse the soft tissue envelope.\par \par A 6 inch bolster was placed underneath the knee to place the LEFT knee in a slightly flexed position. The superolateral injection site was identified using the ultrasound probe to identify the suprapatellar space and superior boarder of the patella first longitudinally and then transversely. The injection site was marked and prepped with a ChloraPrep swab and anesthetized with ethylchloride skin anesthesia. Under sterile technique a 20g 3 1/2 in spinal needle with a preloaded Zilretta syringe was passed through the injection site towards the suprapatellar space under live ultrasound guidance and noted to penetrate the joint capsule. The medication was injected without resistance under live ultrasound visualization and noted to flow into the suprapatellar joint space after aspiration of 60 cc clear straw colored fluid. The injection site was sterilely dressed, there was minimal blood loss.\par \par The patient tolerated this procedure without any complications done by myself. Images were recorded and saved.\par \par The patient has been advised that if they notice any worsening of symptoms or any problems to contact me and seek care from a qualified medical professional. The patient was instructed to ice the knee and take NSAID medication on an as needed basis if the patient feels discomfort.\par \par Followup injection [6 months]

## 2020-07-22 LAB
B PERT IGG+IGM PNL SER: CLEAR
COLOR FLD: YELLOW
EOSINOPHIL # FLD MANUAL: 0 %
FLUID INTAKE SUBSTANCE CLASS: NORMAL
LYMPHOCYTES # FLD MANUAL: 49 %
MESOTHL CELL NFR FLD: 0 %
MONOS+MACROS NFR FLD MANUAL: 45 %
NEUTS SEG # FLD MANUAL: 6 %
NRBC # FLD: 0
RBC # FLD MANUAL: 1000 /UL
SYCRY CLARITY: CLEAR
SYCRY COLOR: YELLOW
SYCRY ID: NORMAL
SYCRY TUBE: NORMAL
TOTAL CELLS COUNTED FLD: 221 /UL
TUBE TYPE: NORMAL
UNIDENT CELLS NFR FLD MANUAL: 0 %
VARIANT LYMPHS # FLD MANUAL: 0 %

## 2020-08-05 LAB — BACTERIA FLD CULT: NORMAL

## 2020-11-21 ENCOUNTER — APPOINTMENT (OUTPATIENT)
Dept: ORTHOPEDIC SURGERY | Facility: CLINIC | Age: 55
End: 2020-11-21
Payer: COMMERCIAL

## 2020-11-21 VITALS — HEIGHT: 72 IN | BODY MASS INDEX: 42.66 KG/M2 | WEIGHT: 315 LBS

## 2020-11-21 PROCEDURE — 99214 OFFICE O/P EST MOD 30 MIN: CPT

## 2020-11-21 PROCEDURE — 73564 X-RAY EXAM KNEE 4 OR MORE: CPT | Mod: LT

## 2020-11-21 RX ORDER — DICLOFENAC SODIUM 75 MG/1
75 TABLET, DELAYED RELEASE ORAL
Qty: 60 | Refills: 1 | Status: COMPLETED | COMMUNITY
Start: 2020-11-21 | End: 2021-01-20

## 2020-11-21 NOTE — DISCUSSION/SUMMARY
[de-identified] : Left knee osteoarthritis\par Complex medial meniscus tear with extrusion\par \par The patient and I discussed the causes and progression of degenerative joint disease of the knee. Models, diagrams and drawings were used in the discussion. Treatment can include conservative non-operative management and surgical options. Conservative management includes weight loss, activity modification, physical therapy to improve motion and strength in the muscles around the knee and the body's core, PO and topical NSAIDs, corticosteroid and/or viscosupplementation intra-articular injections. If the patient fails to improve with non-operative management, surgical management is possible. Depending upon the patient's age, BMI, activity level, degree and location of arthrosis different surgical options are possible including arthroscopic debridement with chondroplasty, high-tibial osteotomy, unicondylar/partial arthroplasty, and total joint arthroplasty.\par \par diclofenac\par \par Due to failure of prior non-operative modalities of treatment including physical therapy, activity modification, non-steroidal anti-inflammatory medications, and weight-loss with inability to give corticosteroid injection due to the patient's diabetic status over concern for unsafe rise in blood glucose causing injury to the patient, I am ordering a viscosupplementation injection Zilretta left knee and will obtain insurance authorization. The patient will be contacted by our authorization department over its status, copayment and when available for injection.\par Patient has an MRI approved by his PMD, I recommend he continues and obtains the PMD ordered mri and followup afterwards with a CD for my evaluation of the images\par \par The patient verifies their understanding the the visit, diagnosis and plan. They agree with the treatment plan and will contact the office with any questions or problems.\par \par Follow up\par 4 injection

## 2020-11-21 NOTE — HISTORY OF PRESENT ILLNESS
[de-identified] : CC Left knee\par \par HPI 53 yo male right HD presents for 4 month Zilretta Injection FU of [chronic] onset of 3 months of constant pain in the posterior lateral and anterior Left knee [without injury]. The pain is same, and rated a 6 out of 10, described as sharp, with radiation to the heel. [Rest] makes the pain better and [walking standing stairs] makes the pain worse. The patient reports associated symptoms of pop and click. The patient - pain at night affecting sleep, and - similar pain previously.\par \par The patient has tried the following treatments:\par Activity modification	+\par Ice/Compression  	+\par Braces    		-\par Nsaids    		+ Diclofenac some relief\par Physical Therapy 	+ \par Cortisone Injection	+ Zilretta  10/2019 3 month 100%, 7-2020 4 month help\par Visco Injection		not covered by insurance\par Arthroscopy		-\par \par wants more zilretta\par \par Review of Systems is positive for the above musculoskeletal symptoms and is otherwise non-contributory for general, constitutional, psychiatric, neurologic, HEENT, cardiac, respiratory, gastrointestinal, reproductive, lymphatic, and dermatologic complaints.\par \par Consult by Dr Nicole Grossman\par \par

## 2020-11-21 NOTE — PHYSICAL EXAM
[de-identified] : Physical Examination\par General: well nourished, in no acute distress, alert and oriented to person, place and time\par Psychiatric: normal mood and affect, no abnormal movements or speech patterns\par Eyes: vision intact - glasses\par Throat: no thyromegaly\par Lymph: no enlarged nodes, no lymphedema in extremity\par Respiratory: no wheezing, no shortness of breath with ambulation\par Cardiac: no cardiac leg swelling, 2+ peripheral pulses\par Neurology: normal gross sensation in extremities to light touch\par Abdomen: soft, non-tender, tympanic, no masses\par \par Musculoskeletal Examination\par Ambulation	+ antalgic gait, - assistive devices\par \par Knee			Right			Left\par General\par      Swelling/Deformity	normal			normal	\par      Skin			normal			normal\par      Erythema		-			-\par      Standing Alignment	neutral			varus\par      Effusion		none			trace\par Range of Motion\par      Hip			full painless ROM		full painless ROM\par      Knee Flexion		100			90\par      Knee Extension	                 0			0\par Patella\par      J Sign		-			-\par      Quad Medial/Lateral	1/1 1/1\par      Apprehension		-			-\par      Jr's		                -			+\par      Grind Sign		-			+\par      Crepitus		-			+\par Palpation\par      Medial Joint Line	                 -			+\par      Medial Fem Condyle	-			-\par      Lateral Joint Line	-			-\par      Quad Tendon		-			-\par      Patella Tendon	               -			-\par      Medial Patella		-			-\par      Lateral Patella 	                  -			-\par      Posterior Knee	                -			+\par Ligamentous\par      Varus @ 0° / 30°	-/-			-/-\par      Valgus @ 0° / 30°	-/-			-/-\par      Lachman		-			-\par      Pivot Shift		-			-\par      Anterior Drawer           	-			-\par      Posterior Drawer	-			-\par Meniscus\par      Luis		-			+\par      Flexion Pinch		-			posterior pain\par Strength Examination/Atrophy\par      Hip Flexors 		5+			5+\par      Quadriceps		5+			5+\par      Hamstring		5+			5+\par      Tibialis Anterior	                5+			5+\par      Achilles/Soleus	               5+			5+\par Sensation\par      Deep Peroneal	normal			normal\par      Superficial Peroneal 	normal			normal\par      Sural  		normal			normal\par      Posterior Tibial 	normal			normal\par      Saphneous 		normal			normal\par Pulses\par      DP			2+			2+\par \par \par  [de-identified] : 5 views of the affected Left knee (standing AP, flexing standing AP, 30degree flexed lateral, 0degree lateral, sunrise view)\par 3-2019\par demonstrate:\par There is severe medial weightbearing asymmetric narrowing\par Small to moderate osteophytic lipping\par Small suprapatellar effusion\par Mild patellofemoral joint space loss without evidence of tilt [or] subluxation on sunrise view\par Normal soft tissue density\par Otherwise normal osseous bone structure without fracture or dislocation\par \par \par MRI Left knee [facility] on 4-8-19\par My impression of the images:\par Quality of the MRI is poor\par Medial Meniscus extruded and degenerative tear\par Lateral Meniscus ok\par There is severe chondral loss in the medial greater than patellofemoral greater than compartments\par There is bone marrow edema[/subchondral cysts] in the medial and patellofemoral compartments\par LCL is intact\par MCL is intact\par ACL is intact\par PCL is intact \par Quadriceps Tendon is intact\par Patella Tendon is intact\par \par The Final Radiologist Impression:\par There is moderate to severe medial compartment arthrosis with severe joint space narrowing, broad area of full-thickness articular cartilage loss of the central weightbearing surface of the femoral condyle and tibial plateau with reactive marrow edema and marginal osteophytes. The medial meniscus is diffusely degenerated and torn and there is extrusion of the body segment.\par The lateral meniscus is intact without degenerative signal. There is mucoid degeneration of the anterior and posterior cruciate ligaments. The medial and lateral collateral ligament complex are intact. The extensor mechanism is intact.\par There is a lobulated intraosseous ganglion within the femoral attachment of the anterior cruciate ligament. There is heterogeneous marrow signal within the distal femur without infiltrative lesion. The articular cartilage of the lateral and patellofemoral compartments is maintained. There is a small joint effusion. There is no significant popliteal cyst. The para-articular musculature is unremarkable. There is subcutaneous soft tissue edema.\par IMPRESSION:\par Moderate to severe medial compartment arthrosis with diffusely degenerated and torn medial meniscus and extrusion of the body segment.\par \par 5 views of the affected Left knee (standing AP, flexing standing AP, 30degree flexed lateral, 0degree lateral, sunrise view)\par 10-21-19\par demonstrate:\par There is severe medial weightbearing asymmetric narrowing\par Small to moderate osteophytic lipping\par Small suprapatellar effusion\par Mild patellofemoral joint space loss without evidence of tilt [or] subluxation on sunrise view\par Normal soft tissue density\par Otherwise normal osseous bone structure without fracture or dislocation\par \par 5 views of the affected Left knee (standing AP, flexing standing AP, 30degree flexed lateral, 0degree lateral, sunrise view)\par were ordered, obtained and evaluated by myself today and\par demonstrate:\par There is severe medial weightbearing asymmetric narrowing\par Small to moderate osteophytic lipping\par Small suprapatellar effusion\par Mild patellofemoral joint space loss without evidence of tilt [or] subluxation on sunrise view\par Normal soft tissue density\par Otherwise normal osseous bone structure without fracture or dislocation

## 2020-12-16 ENCOUNTER — APPOINTMENT (OUTPATIENT)
Dept: ORTHOPEDIC SURGERY | Facility: CLINIC | Age: 55
End: 2020-12-16
Payer: COMMERCIAL

## 2020-12-16 VITALS — TEMPERATURE: 96.6 F

## 2020-12-16 PROCEDURE — 99072 ADDL SUPL MATRL&STAF TM PHE: CPT

## 2020-12-16 PROCEDURE — 20611 DRAIN/INJ JOINT/BURSA W/US: CPT | Mod: LT

## 2020-12-16 NOTE — PROCEDURE
[de-identified] : Chief complaint:     Left  knee pain\par \par Diagnosis:              Left knee osteoarthritis\par \par \par Injection:\par Zilretta x1\par [id number] expiration 6-21\par \par \par Procedure Note:\par \par Risks and benefits of an arthrocentesis and intraarticular extended release cortisone injection of the Left  knee were discussed with the patient. Potential adverse effects were discussed including but not limited to bleeding, skin/ joint infection, local skin reactions including bleaching, bruising, stiffness, soreness, vasovagal episodes, transient hyperglycemia, avascular necrosis, pseudo-septic type reactions, post injection joint pain, elevation of blood glucose, allergic reaction to product or anesthetic and other rare but potential adverse effects along with benefits including decreased pain and improved stability prior to obtaining verbal informed consent. It was also discussed that for some patients the treatment is ineffective and there are no guarantees that the patient will experience improvement as the result of the injection. In rare occasions the injection can cause worsening of pain.\par \par Zilretta vials were preparred according to instructions and drawn up into a 5cc syringe.\par \par The use of a Sonosite 15-6 MHz linear transducer with live ultrasound guidance of the knee was necessary given the patient's BMI and local body habitus overlying and obscuring the accurate identification of normal body bony anatomy used to identify the injection site and the depth of soft tissue envelope necessitating a longer than normal needle to reach the joint space, and to confirm the location of the needle tip and intra-articular delivery of the medication. Without the use of live ultrasound guidance the injection would have been more difficult and place the patient's neurovascular structures at risk from the longer needle needed to traverse the soft tissue envelope.\par \par A 6 inch bolster was placed underneath the knee to place the LEFT knee in a slightly flexed position. The superolateral injection site was identified using the ultrasound probe to identify the suprapatellar space and superior boarder of the patella first longitudinally and then transversely. The injection site was marked and prepped with a ChloraPrep swab and anesthetized with ethylchloride skin anesthesia. Under sterile technique a 20g 3 1/2 in spinal needle with a preloaded Zilretta syringe was passed through the injection site towards the suprapatellar space under live ultrasound guidance and noted to penetrate the joint capsule. The medication was injected without resistance under live ultrasound visualization and noted to flow into the suprapatellar joint space after aspiration of 60 cc clear straw colored fluid. The injection site was sterilely dressed, there was minimal blood loss.\par \par The patient tolerated this procedure without any complications done by myself. Images were recorded and saved.\par \par The patient has been advised that if they notice any worsening of symptoms or any problems to contact me and seek care from a qualified medical professional. The patient was instructed to ice the knee and take NSAID medication on an as needed basis if the patient feels discomfort.\par \par Followup injection [6 months]

## 2021-05-01 ENCOUNTER — APPOINTMENT (OUTPATIENT)
Dept: ORTHOPEDIC SURGERY | Facility: CLINIC | Age: 56
End: 2021-05-01
Payer: COMMERCIAL

## 2021-05-01 VITALS
SYSTOLIC BLOOD PRESSURE: 148 MMHG | WEIGHT: 315 LBS | DIASTOLIC BLOOD PRESSURE: 86 MMHG | HEIGHT: 72 IN | HEART RATE: 91 BPM | BODY MASS INDEX: 42.66 KG/M2

## 2021-05-01 PROCEDURE — 99214 OFFICE O/P EST MOD 30 MIN: CPT

## 2021-05-01 PROCEDURE — 99072 ADDL SUPL MATRL&STAF TM PHE: CPT

## 2021-05-01 PROCEDURE — 73564 X-RAY EXAM KNEE 4 OR MORE: CPT | Mod: LT

## 2021-05-01 RX ORDER — DICLOFENAC SODIUM 75 MG/1
75 TABLET, DELAYED RELEASE ORAL
Qty: 60 | Refills: 1 | Status: COMPLETED | COMMUNITY
Start: 2021-05-01 | End: 2021-06-30

## 2021-05-01 NOTE — HISTORY OF PRESENT ILLNESS
[de-identified] : CC Left knee\par \par HPI 55 yo male right HD presents for 5 month Zilretta Injection FU of [chronic] onset of 3 months of constant pain in the posterior lateral and anterior Left knee [without injury]. The pain is same, and rated a 6 out of 10, described as sharp, with radiation to the heel. [Rest] makes the pain better and [walking standing stairs] makes the pain worse. The patient reports associated symptoms of pop and click. The patient - pain at night affecting sleep, and - similar pain previously.\par \par The patient has tried the following treatments:\par Activity modification	+\par Ice/Compression  	+\par Braces    		-\par Nsaids    		+ Diclofenac some relief\par Physical Therapy 	+ \par Cortisone Injection	+ Zilretta  10/2019 3 month 100%, 7-2020 4 month help, dec 2020 3 month help\par Visco Injection		not covered by insurance\par Arthroscopy		-\par \par wants more zilretta\par \par Review of Systems is positive for the above musculoskeletal symptoms and is otherwise non-contributory for general, constitutional, psychiatric, neurologic, HEENT, cardiac, respiratory, gastrointestinal, reproductive, lymphatic, and dermatologic complaints.\par \par Consult by Dr Nicole Grossman\par \par

## 2021-05-01 NOTE — DISCUSSION/SUMMARY
[de-identified] : Left knee osteoarthritis\par Complex medial meniscus tear with extrusion\par \par The patient and I discussed the causes and progression of degenerative joint disease of the knee. Models, diagrams and drawings were used in the discussion. Treatment can include conservative non-operative management and surgical options. Conservative management includes weight loss, activity modification, physical therapy to improve motion and strength in the muscles around the knee and the body's core, PO and topical NSAIDs, corticosteroid and/or viscosupplementation intra-articular injections. If the patient fails to improve with non-operative management, surgical management is possible. Depending upon the patient's age, BMI, activity level, degree and location of arthrosis different surgical options are possible including arthroscopic debridement with chondroplasty, high-tibial osteotomy, unicondylar/partial arthroplasty, and total joint arthroplasty.\par \par discussed concern for bone cyst, increasing OA. no avn or bone collapse noted. concern for multiple cortisone inj expressed, recommend weightloss and consider TKA\par \par diclofenac\par \par Due to failure of prior non-operative modalities of treatment including physical therapy, activity modification, non-steroidal anti-inflammatory medications, and weight-loss with inability to give corticosteroid injection due to the patient's diabetic status over concern for unsafe rise in blood glucose causing injury to the patient, I am ordering a viscosupplementation injection Zilretta left knee and will obtain insurance authorization. The patient will be contacted by our authorization department over its status, copayment and when available for injection.\par Patient has an MRI approved by his PMD, I recommend he continues and obtains the PMD ordered mri and followup afterwards with a CD for my evaluation of the images\par \par The patient verifies their understanding the the visit, diagnosis and plan. They agree with the treatment plan and will contact the office with any questions or problems.\par \par Follow up\par 4 injection

## 2021-05-01 NOTE — PHYSICAL EXAM
[de-identified] : Physical Examination\par General: well nourished, in no acute distress, alert and oriented to person, place and time\par Psychiatric: normal mood and affect, no abnormal movements or speech patterns\par Eyes: vision intact - glasses\par Throat: no thyromegaly\par Lymph: no enlarged nodes, no lymphedema in extremity\par Respiratory: no wheezing, no shortness of breath with ambulation\par Cardiac: no cardiac leg swelling, 2+ peripheral pulses\par Neurology: normal gross sensation in extremities to light touch\par Abdomen: soft, non-tender, tympanic, no masses\par \par Musculoskeletal Examination\par Ambulation	+ antalgic gait, - assistive devices\par \par Knee			Right			Left\par General\par      Swelling/Deformity	normal			normal	\par      Skin			normal			normal\par      Erythema		-			-\par      Standing Alignment	neutral			varus\par      Effusion		none			trace\par Range of Motion\par      Hip			full painless ROM		full painless ROM\par      Knee Flexion		100			90\par      Knee Extension	                 0			0\par Patella\par      J Sign		-			-\par      Quad Medial/Lateral	1/1 1/1\par      Apprehension		-			-\par      Jr's		                -			+\par      Grind Sign		-			+\par      Crepitus		-			+\par Palpation\par      Medial Joint Line	                 -			+\par      Medial Fem Condyle	-			-\par      Lateral Joint Line	-			-\par      Quad Tendon		-			-\par      Patella Tendon	               -			-\par      Medial Patella		-			-\par      Lateral Patella 	                  -			-\par      Posterior Knee	                -			+\par Ligamentous\par      Varus @ 0° / 30°	-/-			-/-\par      Valgus @ 0° / 30°	-/-			-/-\par      Lachman		-			-\par      Pivot Shift		-			-\par      Anterior Drawer           	-			-\par      Posterior Drawer	-			-\par Meniscus\par      Luis		-			+\par      Flexion Pinch		-			posterior pain\par Strength Examination/Atrophy\par      Hip Flexors 		5+			5+\par      Quadriceps		5+			5+\par      Hamstring		5+			5+\par      Tibialis Anterior	                5+			5+\par      Achilles/Soleus	               5+			5+\par Sensation\par      Deep Peroneal	normal			normal\par      Superficial Peroneal 	normal			normal\par      Sural  		normal			normal\par      Posterior Tibial 	normal			normal\par      Saphneous 		normal			normal\par Pulses\par      DP			2+			2+\par \par \par  [de-identified] : 5 views of the affected Left knee (standing AP, flexing standing AP, 30degree flexed lateral, 0degree lateral, sunrise view)\par 3-2019\par demonstrate:\par There is severe medial weightbearing asymmetric narrowing\par Small to moderate osteophytic lipping\par Small suprapatellar effusion\par Mild patellofemoral joint space loss without evidence of tilt [or] subluxation on sunrise view\par Normal soft tissue density\par Otherwise normal osseous bone structure without fracture or dislocation\par \par \par MRI Left knee [facility] on 4-8-19\par My impression of the images:\par Quality of the MRI is poor\par Medial Meniscus extruded and degenerative tear\par Lateral Meniscus ok\par There is severe chondral loss in the medial greater than patellofemoral greater than compartments\par There is bone marrow edema[/subchondral cysts] in the medial and patellofemoral compartments\par LCL is intact\par MCL is intact\par ACL is intact\par PCL is intact \par Quadriceps Tendon is intact\par Patella Tendon is intact\par \par The Final Radiologist Impression:\par There is moderate to severe medial compartment arthrosis with severe joint space narrowing, broad area of full-thickness articular cartilage loss of the central weightbearing surface of the femoral condyle and tibial plateau with reactive marrow edema and marginal osteophytes. The medial meniscus is diffusely degenerated and torn and there is extrusion of the body segment.\par The lateral meniscus is intact without degenerative signal. There is mucoid degeneration of the anterior and posterior cruciate ligaments. The medial and lateral collateral ligament complex are intact. The extensor mechanism is intact.\par There is a lobulated intraosseous ganglion within the femoral attachment of the anterior cruciate ligament. There is heterogeneous marrow signal within the distal femur without infiltrative lesion. The articular cartilage of the lateral and patellofemoral compartments is maintained. There is a small joint effusion. There is no significant popliteal cyst. The para-articular musculature is unremarkable. There is subcutaneous soft tissue edema.\par IMPRESSION:\par Moderate to severe medial compartment arthrosis with diffusely degenerated and torn medial meniscus and extrusion of the body segment.\par \par 5 views of the affected Left knee (standing AP, flexing standing AP, 30degree flexed lateral, 0degree lateral, sunrise view)\par 10-21-19\par demonstrate:\par There is severe medial weightbearing asymmetric narrowing\par Small to moderate osteophytic lipping\par Small suprapatellar effusion\par Mild patellofemoral joint space loss without evidence of tilt [or] subluxation on sunrise view\par Normal soft tissue density\par Otherwise normal osseous bone structure without fracture or dislocation\par \par 5 views of the affected Left knee (standing AP, flexing standing AP, 30degree flexed lateral, 0degree lateral, sunrise view)\par 11-21-20\par demonstrate:\par There is severe medial weightbearing asymmetric narrowing\par Small to moderate osteophytic lipping\par Small suprapatellar effusion\par Mild patellofemoral joint space loss without evidence of tilt [or] subluxation on sunrise view\par Normal soft tissue density\par Otherwise normal osseous bone structure without fracture or dislocation\par \par 4 views of the affected Left knee (standing AP, flexing standing AP, 30degree flexed lateral, sunrise view)\par were ordered, obtained and evaluated by myself today and\par demonstrate:\par There is severe medial weightbearing asymmetric narrowing\par Small to moderate osteophytic lipping\par Small suprapatellar effusion\par Mild patellofemoral joint space loss without evidence of tilt [or] subluxation on sunrise view\par Normal soft tissue density\par new bone cyst appearing on tibia under medial tibial spine without fracture or dislocation

## 2021-06-23 ENCOUNTER — APPOINTMENT (OUTPATIENT)
Dept: ORTHOPEDIC SURGERY | Facility: CLINIC | Age: 56
End: 2021-06-23
Payer: COMMERCIAL

## 2021-06-23 VITALS — TEMPERATURE: 96.1 F

## 2021-06-23 PROCEDURE — 20611 DRAIN/INJ JOINT/BURSA W/US: CPT | Mod: LT

## 2021-06-23 PROCEDURE — 99072 ADDL SUPL MATRL&STAF TM PHE: CPT

## 2021-06-23 NOTE — PROCEDURE
[de-identified] : Chief complaint:     Left  knee pain\par \par Diagnosis:              Left knee osteoarthritis\par \par \par Injection:\par Zilretta x1\par [id number] expiration 10-21\par \par Procedure Note:\par \par Risks and benefits of an arthrocentesis and intraarticular extended release cortisone injection of the Left  knee were discussed with the patient. Potential adverse effects were discussed including but not limited to bleeding, skin/ joint infection, local skin reactions including bleaching, bruising, stiffness, soreness, vasovagal episodes, transient hyperglycemia, avascular necrosis, pseudo-septic type reactions, post injection joint pain, elevation of blood glucose, allergic reaction to product or anesthetic and other rare but potential adverse effects along with benefits including decreased pain and improved stability prior to obtaining verbal informed consent. It was also discussed that for some patients the treatment is ineffective and there are no guarantees that the patient will experience improvement as the result of the injection. In rare occasions the injection can cause worsening of pain.\par \par Zilretta vials were preparred according to instructions and drawn up into a 5cc syringe.\par \par The use of a Sonosite 15-6 MHz linear transducer with live ultrasound guidance of the knee was necessary given the patient's BMI and local body habitus overlying and obscuring the accurate identification of normal body bony anatomy used to identify the injection site and the depth of soft tissue envelope necessitating a longer than normal needle to reach the joint space, and to confirm the location of the needle tip and intra-articular delivery of the medication. Without the use of live ultrasound guidance the injection would have been more difficult and place the patient's neurovascular structures at risk from the longer needle needed to traverse the soft tissue envelope.\par \par A 6 inch bolster was placed underneath the knee to place the LEFT knee in a slightly flexed position. The superolateral injection site was identified using the ultrasound probe to identify the suprapatellar space and superior boarder of the patella first longitudinally and then transversely. The injection site was marked and prepped with a ChloraPrep swab and anesthetized with ethylchloride skin anesthesia. Under sterile technique a 20g 3 1/2 in spinal needle with a preloaded Zilretta syringe was passed through the injection site towards the suprapatellar space under live ultrasound guidance and noted to penetrate the joint capsule. The medication was injected without resistance under live ultrasound visualization and noted to flow into the suprapatellar joint space after aspiration of 60 cc clear straw colored fluid. The injection site was sterilely dressed, there was minimal blood loss.\par \par The patient tolerated this procedure without any complications done by myself. Images were recorded and saved.\par \par The patient has been advised that if they notice any worsening of symptoms or any problems to contact me and seek care from a qualified medical professional. The patient was instructed to ice the knee and take NSAID medication on an as needed basis if the patient feels discomfort.\par \par Followup injection [6 months]

## 2021-11-23 ENCOUNTER — APPOINTMENT (OUTPATIENT)
Dept: ORTHOPEDIC SURGERY | Facility: CLINIC | Age: 56
End: 2021-11-23
Payer: COMMERCIAL

## 2021-11-23 VITALS
HEIGHT: 72 IN | WEIGHT: 315 LBS | HEART RATE: 95 BPM | BODY MASS INDEX: 42.66 KG/M2 | SYSTOLIC BLOOD PRESSURE: 138 MMHG | DIASTOLIC BLOOD PRESSURE: 85 MMHG

## 2021-11-23 DIAGNOSIS — S83.232A COMPLEX TEAR OF MEDIAL MENISCUS, CURRENT INJURY, LEFT KNEE, INITIAL ENCOUNTER: ICD-10-CM

## 2021-11-23 PROCEDURE — 73564 X-RAY EXAM KNEE 4 OR MORE: CPT | Mod: LT

## 2021-11-23 PROCEDURE — 99214 OFFICE O/P EST MOD 30 MIN: CPT

## 2021-11-23 RX ORDER — DICLOFENAC SODIUM 75 MG/1
75 TABLET, DELAYED RELEASE ORAL
Qty: 60 | Refills: 1 | Status: COMPLETED | COMMUNITY
Start: 2021-11-23 | End: 2022-01-22

## 2021-11-23 NOTE — PHYSICAL EXAM
[de-identified] : Physical Examination\par General: well nourished, in no acute distress, alert and oriented to person, place and time\par Psychiatric: normal mood and affect, no abnormal movements or speech patterns\par Eyes: vision intact - glasses\par Throat: no thyromegaly\par Lymph: no enlarged nodes, no lymphedema in extremity\par Respiratory: no wheezing, no shortness of breath with ambulation\par Cardiac: no cardiac leg swelling, 2+ peripheral pulses\par Neurology: normal gross sensation in extremities to light touch\par Abdomen: soft, non-tender, tympanic, no masses\par \par Musculoskeletal Examination\par Ambulation	+ antalgic gait, - assistive devices\par \par Knee			Right			Left\par General\par      Swelling/Deformity	normal			normal	\par      Skin			normal			normal\par      Erythema		-			-\par      Standing Alignment	neutral			varus\par      Effusion		none			trace\par Range of Motion\par      Hip			full painless ROM		full painless ROM\par      Knee Flexion		100			90\par      Knee Extension	                 0			0\par Patella\par      J Sign		-			-\par      Quad Medial/Lateral	1/1 1/1\par      Apprehension		-			-\par      Jr's		                -			+\par      Grind Sign		-			+\par      Crepitus		-			+\par Palpation\par      Medial Joint Line	                 -			+\par      Medial Fem Condyle	-			-\par      Lateral Joint Line	-			-\par      Quad Tendon		-			-\par      Patella Tendon	               -			-\par      Medial Patella		-			-\par      Lateral Patella 	                  -			-\par      Posterior Knee	                -			+\par Ligamentous\par      Varus @ 0° / 30°	-/-			-/-\par      Valgus @ 0° / 30°	-/-			-/-\par      Lachman		-			-\par      Pivot Shift		-			-\par      Anterior Drawer           	-			-\par      Posterior Drawer	-			-\par Meniscus\par      Luis		-			+\par      Flexion Pinch		-			posterior pain\par Strength Examination/Atrophy\par      Hip Flexors 		5+			5+\par      Quadriceps		5+			5+\par      Hamstring		5+			5+\par      Tibialis Anterior	                5+			5+\par      Achilles/Soleus	               5+			5+\par Sensation\par      Deep Peroneal	normal			normal\par      Superficial Peroneal 	normal			normal\par      Sural  		normal			normal\par      Posterior Tibial 	normal			normal\par      Saphneous 		normal			normal\par Pulses\par      DP			2+			2+\par \par \par  [de-identified] : 5 views of the affected Left knee (standing AP, flexing standing AP, 30degree flexed lateral, 0degree lateral, sunrise view)\par 3-2019\par demonstrate:\par There is severe medial weightbearing asymmetric narrowing\par Small to moderate osteophytic lipping\par Small suprapatellar effusion\par Mild patellofemoral joint space loss without evidence of tilt [or] subluxation on sunrise view\par Normal soft tissue density\par Otherwise normal osseous bone structure without fracture or dislocation\par \par \par MRI Left knee [facility] on 4-8-19\par My impression of the images:\par Quality of the MRI is poor\par Medial Meniscus extruded and degenerative tear\par Lateral Meniscus ok\par There is severe chondral loss in the medial greater than patellofemoral greater than compartments\par There is bone marrow edema[/subchondral cysts] in the medial and patellofemoral compartments\par LCL is intact\par MCL is intact\par ACL is intact\par PCL is intact \par Quadriceps Tendon is intact\par Patella Tendon is intact\par \par The Final Radiologist Impression:\par There is moderate to severe medial compartment arthrosis with severe joint space narrowing, broad area of full-thickness articular cartilage loss of the central weightbearing surface of the femoral condyle and tibial plateau with reactive marrow edema and marginal osteophytes. The medial meniscus is diffusely degenerated and torn and there is extrusion of the body segment.\par The lateral meniscus is intact without degenerative signal. There is mucoid degeneration of the anterior and posterior cruciate ligaments. The medial and lateral collateral ligament complex are intact. The extensor mechanism is intact.\par There is a lobulated intraosseous ganglion within the femoral attachment of the anterior cruciate ligament. There is heterogeneous marrow signal within the distal femur without infiltrative lesion. The articular cartilage of the lateral and patellofemoral compartments is maintained. There is a small joint effusion. There is no significant popliteal cyst. The para-articular musculature is unremarkable. There is subcutaneous soft tissue edema.\par IMPRESSION:\par Moderate to severe medial compartment arthrosis with diffusely degenerated and torn medial meniscus and extrusion of the body segment.\par \par 5 views of the affected Left knee (standing AP, flexing standing AP, 30degree flexed lateral, 0degree lateral, sunrise view)\par 10-21-19\par demonstrate:\par There is severe medial weightbearing asymmetric narrowing\par Small to moderate osteophytic lipping\par Small suprapatellar effusion\par Mild patellofemoral joint space loss without evidence of tilt [or] subluxation on sunrise view\par Normal soft tissue density\par Otherwise normal osseous bone structure without fracture or dislocation\par \par 5 views of the affected Left knee (standing AP, flexing standing AP, 30degree flexed lateral, 0degree lateral, sunrise view)\par 11-21-20\par demonstrate:\par There is severe medial weightbearing asymmetric narrowing\par Small to moderate osteophytic lipping\par Small suprapatellar effusion\par Mild patellofemoral joint space loss without evidence of tilt [or] subluxation on sunrise view\par Normal soft tissue density\par Otherwise normal osseous bone structure without fracture or dislocation\par \par 4 views of the affected Left knee (standing AP, flexing standing AP, 30degree flexed lateral, sunrise view)\par 5-1-21\par demonstrate:\par There is severe medial weightbearing asymmetric narrowing\par Small to moderate osteophytic lipping\par Small suprapatellar effusion\par Mild patellofemoral joint space loss without evidence of tilt [or] subluxation on sunrise view\par Normal soft tissue density\par new bone cyst appearing on tibia under medial tibial spine without fracture or dislocation\par \par 4 views of the affected Left knee (standing AP, flexing standing AP, 30degree flexed lateral, sunrise view)\par were ordered, obtained and evaluated by myself today and\par demonstrate:\par There is severe medial weightbearing asymmetric narrowing\par Small to moderate osteophytic lipping\par Small suprapatellar effusion\par Mild patellofemoral joint space loss without evidence of tilt [or] subluxation on sunrise view\par Normal soft tissue density\par unchanged bone cyst appearing on tibia under medial tibial spine without fracture or dislocation

## 2021-11-23 NOTE — HISTORY OF PRESENT ILLNESS
[de-identified] : CC Left knee\par \par HPI 53 yo male right HD presents for 5 month Zilretta Injection FU of [chronic] onset of 3 months of constant pain in the posterior lateral and anterior Left knee [without injury]. The pain is same, and rated a 6 out of 10, described as sharp, with radiation to the heel. [Rest] makes the pain better and [walking standing stairs] makes the pain worse. The patient reports associated symptoms of pop and click. The patient - pain at night affecting sleep, and - similar pain previously.\par \par The patient has tried the following treatments:\par Activity modification	+\par Ice/Compression  	+\par Braces    		-\par Nsaids    		+ Diclofenac some relief\par Physical Therapy 	+ \par Cortisone Injection	+ Zilretta  10/2019 3 month 100%, 7-2020 4 month help, dec 2020 3 month help\par Visco Injection		not covered by insurance\par Arthroscopy		-\par \par wants more zilretta\par \par Review of Systems is positive for the above musculoskeletal symptoms and is otherwise non-contributory for general, constitutional, psychiatric, neurologic, HEENT, cardiac, respiratory, gastrointestinal, reproductive, lymphatic, and dermatologic complaints.\par \par Consult by Dr Nicole Grossman\par \par

## 2021-11-23 NOTE — DISCUSSION/SUMMARY
[de-identified] : Left knee osteoarthritis\par Complex medial meniscus tear with extrusion\par \par The patient and I discussed the causes and progression of degenerative joint disease of the knee. Models, diagrams and drawings were used in the discussion. Treatment can include conservative non-operative management and surgical options. Conservative management includes weight loss, activity modification, physical therapy to improve motion and strength in the muscles around the knee and the body's core, PO and topical NSAIDs, corticosteroid and/or viscosupplementation intra-articular injections. If the patient fails to improve with non-operative management, surgical management is possible. Depending upon the patient's age, BMI, activity level, degree and location of arthrosis different surgical options are possible including arthroscopic debridement with chondroplasty, high-tibial osteotomy, unicondylar/partial arthroplasty, and total joint arthroplasty.\par \par discussed concern for bone cyst, increasing OA. no avn or bone collapse noted. concern for multiple cortisone inj expressed, recommend weightloss and consider TKA\par \par diclofenac\par \par Due to failure of prior non-operative modalities of treatment including physical therapy, activity modification, non-steroidal anti-inflammatory medications, and weight-loss with inability to give corticosteroid injection due to the patient's diabetic status over concern for unsafe rise in blood glucose causing injury to the patient, I am ordering a viscosupplementation injection Zilretta left knee and will obtain insurance authorization. The patient will be contacted by our authorization department over its status, copayment and when available for injection.\par \par \par The patient verifies their understanding the the visit, diagnosis and plan. They agree with the treatment plan and will contact the office with any questions or problems.\par \par Follow up\par 4 injection

## 2021-12-01 ENCOUNTER — FORM ENCOUNTER (OUTPATIENT)
Age: 56
End: 2021-12-01

## 2021-12-07 ENCOUNTER — APPOINTMENT (OUTPATIENT)
Dept: ORTHOPEDIC SURGERY | Facility: CLINIC | Age: 56
End: 2021-12-07

## 2022-01-04 ENCOUNTER — APPOINTMENT (OUTPATIENT)
Dept: ORTHOPEDIC SURGERY | Facility: CLINIC | Age: 57
End: 2022-01-04
Payer: COMMERCIAL

## 2022-01-04 PROCEDURE — 99214 OFFICE O/P EST MOD 30 MIN: CPT | Mod: 25

## 2022-01-04 PROCEDURE — 20611 DRAIN/INJ JOINT/BURSA W/US: CPT | Mod: LT

## 2022-01-04 NOTE — DISCUSSION/SUMMARY
[de-identified] : Left knee osteoarthritis\par Complex medial meniscus tear with extrusion\par \par The patient and I discussed the causes and progression of degenerative joint disease of the knee. Models, diagrams and drawings were used in the discussion. Treatment can include conservative non-operative management and surgical options. Conservative management includes weight loss, activity modification, physical therapy to improve motion and strength in the muscles around the knee and the body's core, PO and topical NSAIDs, corticosteroid and/or viscosupplementation intra-articular injections. If the patient fails to improve with non-operative management, surgical management is possible. Depending upon the patient's age, BMI, activity level, degree and location of arthrosis different surgical options are possible including arthroscopic debridement with chondroplasty, high-tibial osteotomy, unicondylar/partial arthroplasty, and total joint arthroplasty.\par \par discussed concern for bone cyst, increasing OA. no avn or bone collapse noted. concern for multiple cortisone inj expressed, recommend weightloss and consider TKA\par \par diclofenac\par \par Due to failure of prior non-operative modalities of treatment including physical therapy, activity modification, non-steroidal anti-inflammatory medications, and weight-loss with inability to give corticosteroid injection due to the patient's diabetic status over concern for unsafe rise in blood glucose causing injury to the patient, I am ordering a viscosupplementation injection synvisc one left knee and will obtain insurance authorization. The patient will be contacted by our authorization department over its status, copayment and when available for injection.\par \par Procedure Note:\par \par Verbal consent was obtained for an arthrocentesis and intra-articular ketorolac injection of the LEFT knee, after the risks and benefits were discussed with the patient. Potential adverse effects were discussed including but not limited to bleeding, skin/joint infection, local skin reactions including bleaching, bruising, stiffness, soreness, vasovagal episodes, transient hyperglycemia, avascular necrosis, pseudo-septic type reactions, post injection joint pain, allergic reaction to product or anesthetic and other rare but potential adverse effects along with benefits including decreased pain and improved stability prior to obtaining verbal informed consent. It was also discussed that for some patients the treatment is ineffective and there are no guarantees that the patient will experience improvement as the result of the injection. In rare occasions the injection can cause worsening of pain.\par \par The use of a Sonosite 15-6 MHz linear transducer with live ultrasound guidance of the knee was necessary given the patient's BMI and local body habitus overlying and obscuring the accurate identification of normal body bony anatomy used to identify the injection site and the depth of soft tissue envelope necessitating a longer than normal needle to reach the joint space, and to confirm the location of the needle tip and intra-articular delivery of the medication. Without the use of live ultrasound guidance the injection would have been more difficult and place the patient's neurovascular structures at risk from the longer needle needed to traverse the soft tissue envelope.\par \par A 6 inch bolster was placed underneath the knee to place the knee in a slightly flexed position. The superolateral injection site was identified using the ultrasound probe to identify the suprapatellar space and superior boarder of the patella first longitudinally and then transversely. The injection site was marked and prepped with a ChloraPrep swab and anesthetized with ethylchloride skin anesthesia. Using sterile technique a 20g 3 1/2 in spinal needle with 4 cc total of 1cc 1% lidocaine without epinephrine, 1cc 0.25% Marcaine without epinephrine and 2cc of 60mg/mL Ketorolac was passed through the injection site towards the suprapatellar space under live ultrasound guidance and noted to penetrate the joint capsule. The medication was injected without resistance under live ultrasound visualization and noted to flow into the suprapatellar joint space. The injection site was sterilely dressed, there was minimal blood loss. The patient tolerated this procedure without any complications done by myself. Images were recorded and saved.\par \par The patient has been advised that if they notice any worsening of symptoms or any problems to contact me and seek care from a qualified medical professional. The patient was instructed to ice the knee and take NSAID medication on an as needed basis if the patient feels discomfort.\par \par The patient verifies their understanding the the visit, diagnosis and plan. They agree with the treatment plan and will contact the office with any questions or problems.\par \par Follow up\par visco injection

## 2022-01-04 NOTE — PHYSICAL EXAM
[de-identified] : Physical Examination\par General: well nourished, in no acute distress, alert and oriented to person, place and time\par Psychiatric: normal mood and affect, no abnormal movements or speech patterns\par Eyes: vision intact - glasses\par Throat: no thyromegaly\par Lymph: no enlarged nodes, no lymphedema in extremity\par Respiratory: no wheezing, no shortness of breath with ambulation\par Cardiac: no cardiac leg swelling, 2+ peripheral pulses\par Neurology: normal gross sensation in extremities to light touch\par Abdomen: soft, non-tender, tympanic, no masses\par \par Musculoskeletal Examination\par Ambulation	+ antalgic gait, - assistive devices\par \par Knee			Right			Left\par General\par      Swelling/Deformity	normal			normal	\par      Skin			normal			normal\par      Erythema		-			-\par      Standing Alignment	neutral			varus\par      Effusion		none			trace\par Range of Motion\par      Hip			full painless ROM		full painless ROM\par      Knee Flexion		100			90\par      Knee Extension	                 0			0\par Patella\par      J Sign		-			-\par      Quad Medial/Lateral	1/1 1/1\par      Apprehension		-			-\par      Jr's		                -			+\par      Grind Sign		-			+\par      Crepitus		-			+\par Palpation\par      Medial Joint Line	                 -			+\par      Medial Fem Condyle	-			-\par      Lateral Joint Line	-			-\par      Quad Tendon		-			-\par      Patella Tendon	               -			-\par      Medial Patella		-			-\par      Lateral Patella 	                  -			-\par      Posterior Knee	                -			+\par Ligamentous\par      Varus @ 0° / 30°	-/-			-/-\par      Valgus @ 0° / 30°	-/-			-/-\par      Lachman		-			-\par      Pivot Shift		-			-\par      Anterior Drawer           	-			-\par      Posterior Drawer	-			-\par Meniscus\par      Luis		-			+\par      Flexion Pinch		-			posterior pain\par Strength Examination/Atrophy\par      Hip Flexors 		5+			5+\par      Quadriceps		5+			5+\par      Hamstring		5+			5+\par      Tibialis Anterior	                5+			5+\par      Achilles/Soleus	               5+			5+\par Sensation\par      Deep Peroneal	normal			normal\par      Superficial Peroneal 	normal			normal\par      Sural  		normal			normal\par      Posterior Tibial 	normal			normal\par      Saphneous 		normal			normal\par Pulses\par      DP			2+			2+\par \par \par  [de-identified] : 5 views of the affected Left knee (standing AP, flexing standing AP, 30degree flexed lateral, 0degree lateral, sunrise view)\par 3-2019\par demonstrate:\par There is severe medial weightbearing asymmetric narrowing\par Small to moderate osteophytic lipping\par Small suprapatellar effusion\par Mild patellofemoral joint space loss without evidence of tilt [or] subluxation on sunrise view\par Normal soft tissue density\par Otherwise normal osseous bone structure without fracture or dislocation\par \par \par MRI Left knee from [facility] on 4-8-19\par My impression of the images:\par Quality of the MRI is poor\par Medial Meniscus extruded and degenerative tear\par Lateral Meniscus ok\par There is severe chondral loss in the medial greater than patellofemoral greater than compartments\par There is bone marrow edema[/subchondral cysts] in the medial and patellofemoral compartments\par LCL is intact\par MCL is intact\par ACL is intact\par PCL is intact \par Quadriceps Tendon is intact\par Patella Tendon is intact\par \par The Final Radiologist Impression:\par There is moderate to severe medial compartment arthrosis with severe joint space narrowing, broad area of full-thickness articular cartilage loss of the central weightbearing surface of the femoral condyle and tibial plateau with reactive marrow edema and marginal osteophytes. The medial meniscus is diffusely degenerated and torn and there is extrusion of the body segment.\par The lateral meniscus is intact without degenerative signal. There is mucoid degeneration of the anterior and posterior cruciate ligaments. The medial and lateral collateral ligament complex are intact. The extensor mechanism is intact.\par There is a lobulated intraosseous ganglion within the femoral attachment of the anterior cruciate ligament. There is heterogeneous marrow signal within the distal femur without infiltrative lesion. The articular cartilage of the lateral and patellofemoral compartments is maintained. There is a small joint effusion. There is no significant popliteal cyst. The para-articular musculature is unremarkable. There is subcutaneous soft tissue edema.\par IMPRESSION:\par Moderate to severe medial compartment arthrosis with diffusely degenerated and torn medial meniscus and extrusion of the body segment.\par \par 5 views of the affected Left knee (standing AP, flexing standing AP, 30degree flexed lateral, 0degree lateral, sunrise view)\par 10-21-19\par demonstrate:\par There is severe medial weightbearing asymmetric narrowing\par Small to moderate osteophytic lipping\par Small suprapatellar effusion\par Mild patellofemoral joint space loss without evidence of tilt [or] subluxation on sunrise view\par Normal soft tissue density\par Otherwise normal osseous bone structure without fracture or dislocation\par \par 5 views of the affected Left knee (standing AP, flexing standing AP, 30degree flexed lateral, 0degree lateral, sunrise view)\par 11-21-20\par demonstrate:\par There is severe medial weightbearing asymmetric narrowing\par Small to moderate osteophytic lipping\par Small suprapatellar effusion\par Mild patellofemoral joint space loss without evidence of tilt [or] subluxation on sunrise view\par Normal soft tissue density\par Otherwise normal osseous bone structure without fracture or dislocation\par \par 4 views of the affected Left knee (standing AP, flexing standing AP, 30degree flexed lateral, sunrise view)\par 5-1-21\par demonstrate:\par There is severe medial weightbearing asymmetric narrowing\par Small to moderate osteophytic lipping\par Small suprapatellar effusion\par Mild patellofemoral joint space loss without evidence of tilt [or] subluxation on sunrise view\par Normal soft tissue density\par new bone cyst appearing on tibia under medial tibial spine without fracture or dislocation\par \par 4 views of the affected Left knee (standing AP, flexing standing AP, 30degree flexed lateral, sunrise view)\par were ordered, obtained and evaluated by myself today and\par demonstrate:\par There is severe medial weightbearing asymmetric narrowing\par Small to moderate osteophytic lipping\par Small suprapatellar effusion\par Mild patellofemoral joint space loss without evidence of tilt [or] subluxation on sunrise view\par Normal soft tissue density\par unchanged bone cyst appearing on tibia under medial tibial spine without fracture or dislocation

## 2022-01-04 NOTE — HISTORY OF PRESENT ILLNESS
[de-identified] : CC Left knee\par \par HPI 55 yo male right HD presents for  FU of [chronic] onset of 3 months of constant pain in the posterior lateral and anterior Left knee [without injury]. The pain is same, and rated a 6 out of 10, described as sharp, with radiation to the heel. [Rest] makes the pain better and [walking standing stairs] makes the pain worse. The patient reports associated symptoms of pop and click. The patient - pain at night affecting sleep, and - similar pain previously.\par \par The patient has tried the following treatments:\par Activity modification	+\par Ice/Compression  	+\par Braces    		-\par Nsaids    		+ Diclofenac some relief\par Physical Therapy 	+ \par Cortisone Injection	+ Zilretta  10/2019 3 month 100%, 7-2020 4 month help, dec 2020 3 month help\par Visco Injection		not covered by insurance\par Arthroscopy		-\par \par zilretta denied as not medically necessary\par \par Review of Systems is positive for the above musculoskeletal symptoms and is otherwise non-contributory for general, constitutional, psychiatric, neurologic, HEENT, cardiac, respiratory, gastrointestinal, reproductive, lymphatic, and dermatologic complaints.\par \par Consult by Dr Nicole Grossman\par \par

## 2022-01-04 NOTE — DISCUSSION/SUMMARY
[de-identified] : Left knee osteoarthritis\par Complex medial meniscus tear with extrusion\par \par The patient and I discussed the causes and progression of degenerative joint disease of the knee. Models, diagrams and drawings were used in the discussion. Treatment can include conservative non-operative management and surgical options. Conservative management includes weight loss, activity modification, physical therapy to improve motion and strength in the muscles around the knee and the body's core, PO and topical NSAIDs, corticosteroid and/or viscosupplementation intra-articular injections. If the patient fails to improve with non-operative management, surgical management is possible. Depending upon the patient's age, BMI, activity level, degree and location of arthrosis different surgical options are possible including arthroscopic debridement with chondroplasty, high-tibial osteotomy, unicondylar/partial arthroplasty, and total joint arthroplasty.\par \par discussed concern for bone cyst, increasing OA. no avn or bone collapse noted. concern for multiple cortisone inj expressed, recommend weightloss and consider TKA\par \par diclofenac\par \par Due to failure of prior non-operative modalities of treatment including physical therapy, activity modification, non-steroidal anti-inflammatory medications, and weight-loss with inability to give corticosteroid injection due to the patient's diabetic status over concern for unsafe rise in blood glucose causing injury to the patient, I am ordering a viscosupplementation injection synvisc one left knee and will obtain insurance authorization. The patient will be contacted by our authorization department over its status, copayment and when available for injection.\par \par Procedure Note:\par \par Verbal consent was obtained for an arthrocentesis and intra-articular ketorolac injection of the LEFT knee, after the risks and benefits were discussed with the patient. Potential adverse effects were discussed including but not limited to bleeding, skin/joint infection, local skin reactions including bleaching, bruising, stiffness, soreness, vasovagal episodes, transient hyperglycemia, avascular necrosis, pseudo-septic type reactions, post injection joint pain, allergic reaction to product or anesthetic and other rare but potential adverse effects along with benefits including decreased pain and improved stability prior to obtaining verbal informed consent. It was also discussed that for some patients the treatment is ineffective and there are no guarantees that the patient will experience improvement as the result of the injection. In rare occasions the injection can cause worsening of pain.\par \par The use of a Sonosite 15-6 MHz linear transducer with live ultrasound guidance of the knee was necessary given the patient's BMI and local body habitus overlying and obscuring the accurate identification of normal body bony anatomy used to identify the injection site and the depth of soft tissue envelope necessitating a longer than normal needle to reach the joint space, and to confirm the location of the needle tip and intra-articular delivery of the medication. Without the use of live ultrasound guidance the injection would have been more difficult and place the patient's neurovascular structures at risk from the longer needle needed to traverse the soft tissue envelope.\par \par A 6 inch bolster was placed underneath the knee to place the knee in a slightly flexed position. The superolateral injection site was identified using the ultrasound probe to identify the suprapatellar space and superior boarder of the patella first longitudinally and then transversely. The injection site was marked and prepped with a ChloraPrep swab and anesthetized with ethylchloride skin anesthesia. Using sterile technique a 20g 3 1/2 in spinal needle with 4 cc total of 1cc 1% lidocaine without epinephrine, 1cc 0.25% Marcaine without epinephrine and 2cc of 60mg/mL Ketorolac was passed through the injection site towards the suprapatellar space under live ultrasound guidance and noted to penetrate the joint capsule. The medication was injected without resistance under live ultrasound visualization and noted to flow into the suprapatellar joint space. The injection site was sterilely dressed, there was minimal blood loss. The patient tolerated this procedure without any complications done by myself. Images were recorded and saved.\par \par The patient has been advised that if they notice any worsening of symptoms or any problems to contact me and seek care from a qualified medical professional. The patient was instructed to ice the knee and take NSAID medication on an as needed basis if the patient feels discomfort.\par \par The patient verifies their understanding the the visit, diagnosis and plan. They agree with the treatment plan and will contact the office with any questions or problems.\par \par Follow up\par visco injection

## 2022-01-04 NOTE — PHYSICAL EXAM
[de-identified] : Physical Examination\par General: well nourished, in no acute distress, alert and oriented to person, place and time\par Psychiatric: normal mood and affect, no abnormal movements or speech patterns\par Eyes: vision intact - glasses\par Throat: no thyromegaly\par Lymph: no enlarged nodes, no lymphedema in extremity\par Respiratory: no wheezing, no shortness of breath with ambulation\par Cardiac: no cardiac leg swelling, 2+ peripheral pulses\par Neurology: normal gross sensation in extremities to light touch\par Abdomen: soft, non-tender, tympanic, no masses\par \par Musculoskeletal Examination\par Ambulation	+ antalgic gait, - assistive devices\par \par Knee			Right			Left\par General\par      Swelling/Deformity	normal			normal	\par      Skin			normal			normal\par      Erythema		-			-\par      Standing Alignment	neutral			varus\par      Effusion		none			trace\par Range of Motion\par      Hip			full painless ROM		full painless ROM\par      Knee Flexion		100			90\par      Knee Extension	                 0			0\par Patella\par      J Sign		-			-\par      Quad Medial/Lateral	1/1 1/1\par      Apprehension		-			-\par      Jr's		                -			+\par      Grind Sign		-			+\par      Crepitus		-			+\par Palpation\par      Medial Joint Line	                 -			+\par      Medial Fem Condyle	-			-\par      Lateral Joint Line	-			-\par      Quad Tendon		-			-\par      Patella Tendon	               -			-\par      Medial Patella		-			-\par      Lateral Patella 	                  -			-\par      Posterior Knee	                -			+\par Ligamentous\par      Varus @ 0° / 30°	-/-			-/-\par      Valgus @ 0° / 30°	-/-			-/-\par      Lachman		-			-\par      Pivot Shift		-			-\par      Anterior Drawer           	-			-\par      Posterior Drawer	-			-\par Meniscus\par      Luis		-			+\par      Flexion Pinch		-			posterior pain\par Strength Examination/Atrophy\par      Hip Flexors 		5+			5+\par      Quadriceps		5+			5+\par      Hamstring		5+			5+\par      Tibialis Anterior	                5+			5+\par      Achilles/Soleus	               5+			5+\par Sensation\par      Deep Peroneal	normal			normal\par      Superficial Peroneal 	normal			normal\par      Sural  		normal			normal\par      Posterior Tibial 	normal			normal\par      Saphneous 		normal			normal\par Pulses\par      DP			2+			2+\par \par \par  [de-identified] : 5 views of the affected Left knee (standing AP, flexing standing AP, 30degree flexed lateral, 0degree lateral, sunrise view)\par 3-2019\par demonstrate:\par There is severe medial weightbearing asymmetric narrowing\par Small to moderate osteophytic lipping\par Small suprapatellar effusion\par Mild patellofemoral joint space loss without evidence of tilt [or] subluxation on sunrise view\par Normal soft tissue density\par Otherwise normal osseous bone structure without fracture or dislocation\par \par \par MRI Left knee from [facility] on 4-8-19\par My impression of the images:\par Quality of the MRI is poor\par Medial Meniscus extruded and degenerative tear\par Lateral Meniscus ok\par There is severe chondral loss in the medial greater than patellofemoral greater than compartments\par There is bone marrow edema[/subchondral cysts] in the medial and patellofemoral compartments\par LCL is intact\par MCL is intact\par ACL is intact\par PCL is intact \par Quadriceps Tendon is intact\par Patella Tendon is intact\par \par The Final Radiologist Impression:\par There is moderate to severe medial compartment arthrosis with severe joint space narrowing, broad area of full-thickness articular cartilage loss of the central weightbearing surface of the femoral condyle and tibial plateau with reactive marrow edema and marginal osteophytes. The medial meniscus is diffusely degenerated and torn and there is extrusion of the body segment.\par The lateral meniscus is intact without degenerative signal. There is mucoid degeneration of the anterior and posterior cruciate ligaments. The medial and lateral collateral ligament complex are intact. The extensor mechanism is intact.\par There is a lobulated intraosseous ganglion within the femoral attachment of the anterior cruciate ligament. There is heterogeneous marrow signal within the distal femur without infiltrative lesion. The articular cartilage of the lateral and patellofemoral compartments is maintained. There is a small joint effusion. There is no significant popliteal cyst. The para-articular musculature is unremarkable. There is subcutaneous soft tissue edema.\par IMPRESSION:\par Moderate to severe medial compartment arthrosis with diffusely degenerated and torn medial meniscus and extrusion of the body segment.\par \par 5 views of the affected Left knee (standing AP, flexing standing AP, 30degree flexed lateral, 0degree lateral, sunrise view)\par 10-21-19\par demonstrate:\par There is severe medial weightbearing asymmetric narrowing\par Small to moderate osteophytic lipping\par Small suprapatellar effusion\par Mild patellofemoral joint space loss without evidence of tilt [or] subluxation on sunrise view\par Normal soft tissue density\par Otherwise normal osseous bone structure without fracture or dislocation\par \par 5 views of the affected Left knee (standing AP, flexing standing AP, 30degree flexed lateral, 0degree lateral, sunrise view)\par 11-21-20\par demonstrate:\par There is severe medial weightbearing asymmetric narrowing\par Small to moderate osteophytic lipping\par Small suprapatellar effusion\par Mild patellofemoral joint space loss without evidence of tilt [or] subluxation on sunrise view\par Normal soft tissue density\par Otherwise normal osseous bone structure without fracture or dislocation\par \par 4 views of the affected Left knee (standing AP, flexing standing AP, 30degree flexed lateral, sunrise view)\par 5-1-21\par demonstrate:\par There is severe medial weightbearing asymmetric narrowing\par Small to moderate osteophytic lipping\par Small suprapatellar effusion\par Mild patellofemoral joint space loss without evidence of tilt [or] subluxation on sunrise view\par Normal soft tissue density\par new bone cyst appearing on tibia under medial tibial spine without fracture or dislocation\par \par 4 views of the affected Left knee (standing AP, flexing standing AP, 30degree flexed lateral, sunrise view)\par were ordered, obtained and evaluated by myself today and\par demonstrate:\par There is severe medial weightbearing asymmetric narrowing\par Small to moderate osteophytic lipping\par Small suprapatellar effusion\par Mild patellofemoral joint space loss without evidence of tilt [or] subluxation on sunrise view\par Normal soft tissue density\par unchanged bone cyst appearing on tibia under medial tibial spine without fracture or dislocation

## 2022-01-04 NOTE — HISTORY OF PRESENT ILLNESS
[de-identified] : CC Left knee\par \par HPI 53 yo male right HD presents for  FU of [chronic] onset of 3 months of constant pain in the posterior lateral and anterior Left knee [without injury]. The pain is same, and rated a 6 out of 10, described as sharp, with radiation to the heel. [Rest] makes the pain better and [walking standing stairs] makes the pain worse. The patient reports associated symptoms of pop and click. The patient - pain at night affecting sleep, and - similar pain previously.\par \par The patient has tried the following treatments:\par Activity modification	+\par Ice/Compression  	+\par Braces    		-\par Nsaids    		+ Diclofenac some relief\par Physical Therapy 	+ \par Cortisone Injection	+ Zilretta  10/2019 3 month 100%, 7-2020 4 month help, dec 2020 3 month help\par Visco Injection		not covered by insurance\par Arthroscopy		-\par \par zilretta denied as not medically necessary\par \par Review of Systems is positive for the above musculoskeletal symptoms and is otherwise non-contributory for general, constitutional, psychiatric, neurologic, HEENT, cardiac, respiratory, gastrointestinal, reproductive, lymphatic, and dermatologic complaints.\par \par Consult by Dr Nicole Grossman\par \par

## 2022-01-04 NOTE — DISCUSSION/SUMMARY
[de-identified] : Left knee osteoarthritis\par Complex medial meniscus tear with extrusion\par \par The patient and I discussed the causes and progression of degenerative joint disease of the knee. Models, diagrams and drawings were used in the discussion. Treatment can include conservative non-operative management and surgical options. Conservative management includes weight loss, activity modification, physical therapy to improve motion and strength in the muscles around the knee and the body's core, PO and topical NSAIDs, corticosteroid and/or viscosupplementation intra-articular injections. If the patient fails to improve with non-operative management, surgical management is possible. Depending upon the patient's age, BMI, activity level, degree and location of arthrosis different surgical options are possible including arthroscopic debridement with chondroplasty, high-tibial osteotomy, unicondylar/partial arthroplasty, and total joint arthroplasty.\par \par discussed concern for bone cyst, increasing OA. no avn or bone collapse noted. concern for multiple cortisone inj expressed, recommend weightloss and consider TKA\par \par diclofenac\par \par Due to failure of prior non-operative modalities of treatment including physical therapy, activity modification, non-steroidal anti-inflammatory medications, and weight-loss with inability to give corticosteroid injection due to the patient's diabetic status over concern for unsafe rise in blood glucose causing injury to the patient, I am ordering a viscosupplementation injection synvisc one left knee and will obtain insurance authorization. The patient will be contacted by our authorization department over its status, copayment and when available for injection.\par \par Procedure Note:\par \par Verbal consent was obtained for an arthrocentesis and intra-articular ketorolac injection of the LEFT knee, after the risks and benefits were discussed with the patient. Potential adverse effects were discussed including but not limited to bleeding, skin/joint infection, local skin reactions including bleaching, bruising, stiffness, soreness, vasovagal episodes, transient hyperglycemia, avascular necrosis, pseudo-septic type reactions, post injection joint pain, allergic reaction to product or anesthetic and other rare but potential adverse effects along with benefits including decreased pain and improved stability prior to obtaining verbal informed consent. It was also discussed that for some patients the treatment is ineffective and there are no guarantees that the patient will experience improvement as the result of the injection. In rare occasions the injection can cause worsening of pain.\par \par The use of a Sonosite 15-6 MHz linear transducer with live ultrasound guidance of the knee was necessary given the patient's BMI and local body habitus overlying and obscuring the accurate identification of normal body bony anatomy used to identify the injection site and the depth of soft tissue envelope necessitating a longer than normal needle to reach the joint space, and to confirm the location of the needle tip and intra-articular delivery of the medication. Without the use of live ultrasound guidance the injection would have been more difficult and place the patient's neurovascular structures at risk from the longer needle needed to traverse the soft tissue envelope.\par \par A 6 inch bolster was placed underneath the knee to place the knee in a slightly flexed position. The superolateral injection site was identified using the ultrasound probe to identify the suprapatellar space and superior boarder of the patella first longitudinally and then transversely. The injection site was marked and prepped with a ChloraPrep swab and anesthetized with ethylchloride skin anesthesia. Using sterile technique a 20g 3 1/2 in spinal needle with 4 cc total of 1cc 1% lidocaine without epinephrine, 1cc 0.25% Marcaine without epinephrine and 2cc of 60mg/mL Ketorolac was passed through the injection site towards the suprapatellar space under live ultrasound guidance and noted to penetrate the joint capsule. The medication was injected without resistance under live ultrasound visualization and noted to flow into the suprapatellar joint space. The injection site was sterilely dressed, there was minimal blood loss. The patient tolerated this procedure without any complications done by myself. Images were recorded and saved.\par \par The patient has been advised that if they notice any worsening of symptoms or any problems to contact me and seek care from a qualified medical professional. The patient was instructed to ice the knee and take NSAID medication on an as needed basis if the patient feels discomfort.\par \par The patient verifies their understanding the the visit, diagnosis and plan. They agree with the treatment plan and will contact the office with any questions or problems.\par \par Follow up\par visco injection

## 2022-01-04 NOTE — HISTORY OF PRESENT ILLNESS
[de-identified] : CC Left knee\par \par HPI 55 yo male right HD presents for  FU of [chronic] onset of 3 months of constant pain in the posterior lateral and anterior Left knee [without injury]. The pain is same, and rated a 6 out of 10, described as sharp, with radiation to the heel. [Rest] makes the pain better and [walking standing stairs] makes the pain worse. The patient reports associated symptoms of pop and click. The patient - pain at night affecting sleep, and - similar pain previously.\par \par The patient has tried the following treatments:\par Activity modification	+\par Ice/Compression  	+\par Braces    		-\par Nsaids    		+ Diclofenac some relief\par Physical Therapy 	+ \par Cortisone Injection	+ Zilretta  10/2019 3 month 100%, 7-2020 4 month help, dec 2020 3 month help\par Visco Injection		not covered by insurance\par Arthroscopy		-\par \par zilretta denied as not medically necessary\par \par Review of Systems is positive for the above musculoskeletal symptoms and is otherwise non-contributory for general, constitutional, psychiatric, neurologic, HEENT, cardiac, respiratory, gastrointestinal, reproductive, lymphatic, and dermatologic complaints.\par \par Consult by Dr Nicole Grossman\par \par

## 2022-01-04 NOTE — PHYSICAL EXAM
[de-identified] : Physical Examination\par General: well nourished, in no acute distress, alert and oriented to person, place and time\par Psychiatric: normal mood and affect, no abnormal movements or speech patterns\par Eyes: vision intact - glasses\par Throat: no thyromegaly\par Lymph: no enlarged nodes, no lymphedema in extremity\par Respiratory: no wheezing, no shortness of breath with ambulation\par Cardiac: no cardiac leg swelling, 2+ peripheral pulses\par Neurology: normal gross sensation in extremities to light touch\par Abdomen: soft, non-tender, tympanic, no masses\par \par Musculoskeletal Examination\par Ambulation	+ antalgic gait, - assistive devices\par \par Knee			Right			Left\par General\par      Swelling/Deformity	normal			normal	\par      Skin			normal			normal\par      Erythema		-			-\par      Standing Alignment	neutral			varus\par      Effusion		none			trace\par Range of Motion\par      Hip			full painless ROM		full painless ROM\par      Knee Flexion		100			90\par      Knee Extension	                 0			0\par Patella\par      J Sign		-			-\par      Quad Medial/Lateral	1/1 1/1\par      Apprehension		-			-\par      Jr's		                -			+\par      Grind Sign		-			+\par      Crepitus		-			+\par Palpation\par      Medial Joint Line	                 -			+\par      Medial Fem Condyle	-			-\par      Lateral Joint Line	-			-\par      Quad Tendon		-			-\par      Patella Tendon	               -			-\par      Medial Patella		-			-\par      Lateral Patella 	                  -			-\par      Posterior Knee	                -			+\par Ligamentous\par      Varus @ 0° / 30°	-/-			-/-\par      Valgus @ 0° / 30°	-/-			-/-\par      Lachman		-			-\par      Pivot Shift		-			-\par      Anterior Drawer           	-			-\par      Posterior Drawer	-			-\par Meniscus\par      Luis		-			+\par      Flexion Pinch		-			posterior pain\par Strength Examination/Atrophy\par      Hip Flexors 		5+			5+\par      Quadriceps		5+			5+\par      Hamstring		5+			5+\par      Tibialis Anterior	                5+			5+\par      Achilles/Soleus	               5+			5+\par Sensation\par      Deep Peroneal	normal			normal\par      Superficial Peroneal 	normal			normal\par      Sural  		normal			normal\par      Posterior Tibial 	normal			normal\par      Saphneous 		normal			normal\par Pulses\par      DP			2+			2+\par \par \par  [de-identified] : 5 views of the affected Left knee (standing AP, flexing standing AP, 30degree flexed lateral, 0degree lateral, sunrise view)\par 3-2019\par demonstrate:\par There is severe medial weightbearing asymmetric narrowing\par Small to moderate osteophytic lipping\par Small suprapatellar effusion\par Mild patellofemoral joint space loss without evidence of tilt [or] subluxation on sunrise view\par Normal soft tissue density\par Otherwise normal osseous bone structure without fracture or dislocation\par \par \par MRI Left knee from [facility] on 4-8-19\par My impression of the images:\par Quality of the MRI is poor\par Medial Meniscus extruded and degenerative tear\par Lateral Meniscus ok\par There is severe chondral loss in the medial greater than patellofemoral greater than compartments\par There is bone marrow edema[/subchondral cysts] in the medial and patellofemoral compartments\par LCL is intact\par MCL is intact\par ACL is intact\par PCL is intact \par Quadriceps Tendon is intact\par Patella Tendon is intact\par \par The Final Radiologist Impression:\par There is moderate to severe medial compartment arthrosis with severe joint space narrowing, broad area of full-thickness articular cartilage loss of the central weightbearing surface of the femoral condyle and tibial plateau with reactive marrow edema and marginal osteophytes. The medial meniscus is diffusely degenerated and torn and there is extrusion of the body segment.\par The lateral meniscus is intact without degenerative signal. There is mucoid degeneration of the anterior and posterior cruciate ligaments. The medial and lateral collateral ligament complex are intact. The extensor mechanism is intact.\par There is a lobulated intraosseous ganglion within the femoral attachment of the anterior cruciate ligament. There is heterogeneous marrow signal within the distal femur without infiltrative lesion. The articular cartilage of the lateral and patellofemoral compartments is maintained. There is a small joint effusion. There is no significant popliteal cyst. The para-articular musculature is unremarkable. There is subcutaneous soft tissue edema.\par IMPRESSION:\par Moderate to severe medial compartment arthrosis with diffusely degenerated and torn medial meniscus and extrusion of the body segment.\par \par 5 views of the affected Left knee (standing AP, flexing standing AP, 30degree flexed lateral, 0degree lateral, sunrise view)\par 10-21-19\par demonstrate:\par There is severe medial weightbearing asymmetric narrowing\par Small to moderate osteophytic lipping\par Small suprapatellar effusion\par Mild patellofemoral joint space loss without evidence of tilt [or] subluxation on sunrise view\par Normal soft tissue density\par Otherwise normal osseous bone structure without fracture or dislocation\par \par 5 views of the affected Left knee (standing AP, flexing standing AP, 30degree flexed lateral, 0degree lateral, sunrise view)\par 11-21-20\par demonstrate:\par There is severe medial weightbearing asymmetric narrowing\par Small to moderate osteophytic lipping\par Small suprapatellar effusion\par Mild patellofemoral joint space loss without evidence of tilt [or] subluxation on sunrise view\par Normal soft tissue density\par Otherwise normal osseous bone structure without fracture or dislocation\par \par 4 views of the affected Left knee (standing AP, flexing standing AP, 30degree flexed lateral, sunrise view)\par 5-1-21\par demonstrate:\par There is severe medial weightbearing asymmetric narrowing\par Small to moderate osteophytic lipping\par Small suprapatellar effusion\par Mild patellofemoral joint space loss without evidence of tilt [or] subluxation on sunrise view\par Normal soft tissue density\par new bone cyst appearing on tibia under medial tibial spine without fracture or dislocation\par \par 4 views of the affected Left knee (standing AP, flexing standing AP, 30degree flexed lateral, sunrise view)\par were ordered, obtained and evaluated by myself today and\par demonstrate:\par There is severe medial weightbearing asymmetric narrowing\par Small to moderate osteophytic lipping\par Small suprapatellar effusion\par Mild patellofemoral joint space loss without evidence of tilt [or] subluxation on sunrise view\par Normal soft tissue density\par unchanged bone cyst appearing on tibia under medial tibial spine without fracture or dislocation

## 2022-01-13 ENCOUNTER — APPOINTMENT (OUTPATIENT)
Dept: ORTHOPEDIC SURGERY | Facility: HOSPITAL | Age: 57
End: 2022-01-13

## 2022-01-26 ENCOUNTER — APPOINTMENT (OUTPATIENT)
Dept: ORTHOPEDIC SURGERY | Facility: CLINIC | Age: 57
End: 2022-01-26

## 2022-02-08 ENCOUNTER — APPOINTMENT (OUTPATIENT)
Dept: ORTHOPEDIC SURGERY | Facility: CLINIC | Age: 57
End: 2022-02-08
Payer: COMMERCIAL

## 2022-02-08 PROCEDURE — 99214 OFFICE O/P EST MOD 30 MIN: CPT | Mod: 25

## 2022-02-08 PROCEDURE — 20611 DRAIN/INJ JOINT/BURSA W/US: CPT | Mod: LT

## 2022-02-08 RX ORDER — HYLAN G-F 20 16MG/2ML
48 SYRINGE (ML) INTRAARTICULAR
Qty: 1 | Refills: 0 | Status: ACTIVE | COMMUNITY
Start: 2022-02-08 | End: 1900-01-01

## 2022-02-08 NOTE — PHYSICAL EXAM
[de-identified] : Physical Examination\par General: well nourished, in no acute distress, alert and oriented to person, place and time\par Psychiatric: normal mood and affect, no abnormal movements or speech patterns\par Eyes: vision intact - glasses\par Throat: no thyromegaly\par Lymph: no enlarged nodes, no lymphedema in extremity\par Respiratory: no wheezing, no shortness of breath with ambulation\par Cardiac: no cardiac leg swelling, 2+ peripheral pulses\par Neurology: normal gross sensation in extremities to light touch\par Abdomen: soft, non-tender, tympanic, no masses\par \par Musculoskeletal Examination\par Ambulation	+ antalgic gait, - assistive devices\par \par Knee			Right			Left\par General\par      Swelling/Deformity	normal			normal	\par      Skin			normal			normal\par      Erythema		-			-\par      Standing Alignment	neutral			varus\par      Effusion		none			trace\par Range of Motion\par      Hip			full painless ROM		full painless ROM\par      Knee Flexion		100			90\par      Knee Extension	                 0			0\par Patella\par      J Sign		-			-\par      Quad Medial/Lateral	1/1 1/1\par      Apprehension		-			-\par      Jr's		                -			+\par      Grind Sign		-			+\par      Crepitus		-			+\par Palpation\par      Medial Joint Line	                 -			+\par      Medial Fem Condyle	-			-\par      Lateral Joint Line	-			-\par      Quad Tendon		-			-\par      Patella Tendon	               -			-\par      Medial Patella		-			-\par      Lateral Patella 	                  -			-\par      Posterior Knee	                -			+\par Ligamentous\par      Varus @ 0° / 30°	-/-			-/-\par      Valgus @ 0° / 30°	-/-			-/-\par      Lachman		-			-\par      Pivot Shift		-			-\par      Anterior Drawer           	-			-\par      Posterior Drawer	-			-\par Meniscus\par      Luis		-			+\par      Flexion Pinch		-			posterior pain\par Strength Examination/Atrophy\par      Hip Flexors 		5+			5+\par      Quadriceps		5+			5+\par      Hamstring		5+			5+\par      Tibialis Anterior	                5+			5+\par      Achilles/Soleus	               5+			5+\par Sensation\par      Deep Peroneal	normal			normal\par      Superficial Peroneal 	normal			normal\par      Sural  		normal			normal\par      Posterior Tibial 	normal			normal\par      Saphneous 		normal			normal\par Pulses\par      DP			2+			2+\par \par \par  [de-identified] : 5 views of the affected Left knee (standing AP, flexing standing AP, 30degree flexed lateral, 0degree lateral, sunrise view)\par 3-2019\par demonstrate:\par There is severe medial weightbearing asymmetric narrowing\par Small to moderate osteophytic lipping\par Small suprapatellar effusion\par Mild patellofemoral joint space loss without evidence of tilt [or] subluxation on sunrise view\par Normal soft tissue density\par Otherwise normal osseous bone structure without fracture or dislocation\par \par \par MRI Left knee [facility] on 4-8-19\par My impression of the images:\par Quality of the MRI is poor\par Medial Meniscus extruded and degenerative tear\par Lateral Meniscus ok\par There is severe chondral loss in the medial greater than patellofemoral greater than compartments\par There is bone marrow edema[/subchondral cysts] in the medial and patellofemoral compartments\par LCL is intact\par MCL is intact\par ACL is intact\par PCL is intact \par Quadriceps Tendon is intact\par Patella Tendon is intact\par \par The Final Radiologist Impression:\par There is moderate to severe medial compartment arthrosis with severe joint space narrowing, broad area of full-thickness articular cartilage loss of the central weightbearing surface of the femoral condyle and tibial plateau with reactive marrow edema and marginal osteophytes. The medial meniscus is diffusely degenerated and torn and there is extrusion of the body segment.\par The lateral meniscus is intact without degenerative signal. There is mucoid degeneration of the anterior and posterior cruciate ligaments. The medial and lateral collateral ligament complex are intact. The extensor mechanism is intact.\par There is a lobulated intraosseous ganglion within the femoral attachment of the anterior cruciate ligament. There is heterogeneous marrow signal within the distal femur without infiltrative lesion. The articular cartilage of the lateral and patellofemoral compartments is maintained. There is a small joint effusion. There is no significant popliteal cyst. The para-articular musculature is unremarkable. There is subcutaneous soft tissue edema.\par IMPRESSION:\par Moderate to severe medial compartment arthrosis with diffusely degenerated and torn medial meniscus and extrusion of the body segment.\par \par 5 views of the affected Left knee (standing AP, flexing standing AP, 30degree flexed lateral, 0degree lateral, sunrise view)\par 10-21-19\par demonstrate:\par There is severe medial weightbearing asymmetric narrowing\par Small to moderate osteophytic lipping\par Small suprapatellar effusion\par Mild patellofemoral joint space loss without evidence of tilt [or] subluxation on sunrise view\par Normal soft tissue density\par Otherwise normal osseous bone structure without fracture or dislocation\par \par 5 views of the affected Left knee (standing AP, flexing standing AP, 30degree flexed lateral, 0degree lateral, sunrise view)\par 11-21-20\par demonstrate:\par There is severe medial weightbearing asymmetric narrowing\par Small to moderate osteophytic lipping\par Small suprapatellar effusion\par Mild patellofemoral joint space loss without evidence of tilt [or] subluxation on sunrise view\par Normal soft tissue density\par Otherwise normal osseous bone structure without fracture or dislocation\par \par 4 views of the affected Left knee (standing AP, flexing standing AP, 30degree flexed lateral, sunrise view)\par 5-1-21\par demonstrate:\par There is severe medial weightbearing asymmetric narrowing\par Small to moderate osteophytic lipping\par Small suprapatellar effusion\par Mild patellofemoral joint space loss without evidence of tilt [or] subluxation on sunrise view\par Normal soft tissue density\par new bone cyst appearing on tibia under medial tibial spine without fracture or dislocation\par \par 4 views of the affected Left knee (standing AP, flexing standing AP, 30degree flexed lateral, sunrise view)\par were ordered, obtained and evaluated by myself today and\par demonstrate:\par There is severe medial weightbearing asymmetric narrowing\par Small to moderate osteophytic lipping\par Small suprapatellar effusion\par Mild patellofemoral joint space loss without evidence of tilt [or] subluxation on sunrise view\par Normal soft tissue density\par unchanged bone cyst appearing on tibia under medial tibial spine without fracture or dislocation

## 2022-02-08 NOTE — DISCUSSION/SUMMARY
[de-identified] : Left knee osteoarthritis\par Complex medial meniscus tear with extrusion\par \par The patient and I discussed the causes and progression of degenerative joint disease of the knee. Models, diagrams and drawings were used in the discussion. Treatment can include conservative non-operative management and surgical options. Conservative management includes weight loss, activity modification, physical therapy to improve motion and strength in the muscles around the knee and the body's core, PO and topical NSAIDs, corticosteroid and/or viscosupplementation intra-articular injections. If the patient fails to improve with non-operative management, surgical management is possible. Depending upon the patient's age, BMI, activity level, degree and location of arthrosis different surgical options are possible including arthroscopic debridement with chondroplasty, high-tibial osteotomy, unicondylar/partial arthroplasty, and total joint arthroplasty.\par \par discussed concern for bone cyst, increasing OA. no avn or bone collapse noted. concern for multiple cortisone inj expressed, recommend weightloss and consider TKA\par \par diclofenac\par \par Due to failure of prior non-operative modalities of treatment including physical therapy, activity modification, non-steroidal anti-inflammatory medications, and weight-loss with inability to give corticosteroid injection due to the patient's diabetic status over concern for unsafe rise in blood glucose causing injury to the patient, I am ordering a viscosupplementation injection synvisc one left knee and will obtain insurance authorization. The patient will be contacted by our authorization department over its status, copayment and when available for injection.\par \par Chief complaint:     Left  knee pain\par \par Diagnosis:              Left knee osteoarthritis\par \par \par Injection:\par Zilretta x1\par [id number] 2-2023\par \par \par Procedure Note:\par \par Risks and benefits of an arthrocentesis and intraarticular extended release cortisone injection of the Left  knee were discussed with the patient. Potential adverse effects were discussed including but not limited to bleeding, skin/ joint infection, local skin reactions including bleaching, bruising, stiffness, soreness, vasovagal episodes, transient hyperglycemia, avascular necrosis, pseudo-septic type reactions, post injection joint pain, elevation of blood glucose, allergic reaction to product or anesthetic and other rare but potential adverse effects along with benefits including decreased pain and improved stability prior to obtaining verbal informed consent. It was also discussed that for some patients the treatment is ineffective and there are no guarantees that the patient will experience improvement as the result of the injection. In rare occasions the injection can cause worsening of pain.\par \par Zilretta vials were preparred according to instructions and drawn up into a 5cc syringe.\par \par The use of a Sonosite 15-6 MHz linear transducer with live ultrasound guidance of the knee was necessary given the patient's BMI and local body habitus overlying and obscuring the accurate identification of normal body bony anatomy used to identify the injection site and the depth of soft tissue envelope necessitating a longer than normal needle to reach the joint space, and to confirm the location of the needle tip and intra-articular delivery of the medication. Without the use of live ultrasound guidance the injection would have been more difficult and place the patient's neurovascular structures at risk from the longer needle needed to traverse the soft tissue envelope.\par \par A 6 inch bolster was placed underneath the knee to place the LEFT knee in a slightly flexed position. The superolateral injection site was identified using the ultrasound probe to identify the suprapatellar space and superior boarder of the patella first longitudinally and then transversely. The injection site was marked and prepped with a ChloraPrep swab and anesthetized with ethylchloride skin anesthesia. Under sterile technique a 20g 3 1/2 in spinal needle with a preloaded Zilretta syringe was passed through the injection site towards the suprapatellar space under live ultrasound guidance and noted to penetrate the joint capsule. The medication was injected without resistance under live ultrasound visualization and noted to flow into the suprapatellar joint space. The injection site was sterilely dressed, there was minimal blood loss.\par \par The patient tolerated this procedure without any complications done by myself. Images were recorded and saved.\par \par The patient has been advised that if they notice any worsening of symptoms or any problems to contact me and seek care from a qualified medical professional. The patient was instructed to ice the knee and take NSAID medication on an as needed basis if the patient feels discomfort.\par \par The patient verifies their understanding the the visit, diagnosis and plan. They agree with the treatment plan and will contact the office with any questions or problems.\par \par Follow up\par visco injection

## 2022-02-08 NOTE — HISTORY OF PRESENT ILLNESS
[de-identified] : CC Left knee\par \par HPI 55 yo male right HD presents for  FU of [chronic] onset of 3 months of constant pain in the posterior lateral and anterior Left knee [without injury]. The pain is same, and rated a 6 out of 10, described as sharp, with radiation to the heel. [Rest] makes the pain better and [walking standing stairs] makes the pain worse. The patient reports associated symptoms of pop and click. The patient - pain at night affecting sleep, and - similar pain previously.\par \par The patient has tried the following treatments:\par Activity modification	+\par Ice/Compression  	+\par Braces    		-\par Nsaids    		+ Diclofenac some relief\par Physical Therapy 	+ \par Cortisone Injection	+ Zilretta  10/2019 3 month 100%, 7-2020 4 month help, dec 2020 3 month help\par Visco Injection		not covered by insurance\par Arthroscopy		-\par \par zilretta denied as not medically necessary\par \par Review of Systems is positive for the above musculoskeletal symptoms and is otherwise non-contributory for general, constitutional, psychiatric, neurologic, HEENT, cardiac, respiratory, gastrointestinal, reproductive, lymphatic, and dermatologic complaints.\par \par Consult by Dr Nicole Grossman\par \par

## 2022-02-10 ENCOUNTER — FORM ENCOUNTER (OUTPATIENT)
Age: 57
End: 2022-02-10

## 2022-03-08 ENCOUNTER — APPOINTMENT (OUTPATIENT)
Dept: ORTHOPEDIC SURGERY | Facility: CLINIC | Age: 57
End: 2022-03-08
Payer: COMMERCIAL

## 2022-03-08 PROCEDURE — 20611 DRAIN/INJ JOINT/BURSA W/US: CPT | Mod: LT

## 2022-03-08 NOTE — PROCEDURE
[de-identified] : Chief complaint:     Left  knee pain\par \par Diagnosis:              Left knee osteoarthritis\par \par \par Injection: Durolane x 1\par 2024-08-31 Lot # [id number]\par \par Procedure Note:\par \par Risks and benefits of a hyaluronic acid injection of the Left  knee were discussed with the patient. Potential adverse effects were discussed including but not limited to bleeding, skin/ joint infection, local skin reactions including bleaching, bruising, stiffness, soreness, vasovagal episodes, transient hyperglycemia, avascular necrosis, pseudo-septic type reactions, post injection joint pain, elevation of blood glucose, allergic reaction to product or anesthetic and other rare but potential adverse effects along with benefits including decreased pain and improved stability prior to obtaining verbal informed consent. It was also discussed that for some patients the treatment is ineffective and there are no guarantees that the patient will experience improvement as the result of the injection. In rare occasions the injection can cause worsening of pain.\par \par The use of a Sonosite 15-6 MHz linear transducer with live ultrasound guidance of the knee was necessary given the patient's BMI and local body habitus overlying and obscuring the accurate identification of normal body bony anatomy used to identify the injection site and the depth of soft tissue envelope necessitating a longer than normal needle to reach the joint space, and to confirm the location of the needle tip and intra-articular delivery of the medication. Without the use of live ultrasound guidance the injection would have been more difficult and place the patient's neurovascular structures at risk from the longer needle needed to traverse the soft tissue envelope.\par \par A 6 inch bolster was placed underneath the knee to place the LEFT knee in a slightly flexed position. The superolateral injection site was identified using the ultrasound probe to identify the suprapatellar space and superior boarder of the patella first longitudinally and then transversely. The injection site was marked and prepped with a ChloraPrep swab and anesthetized with ethylchloride skin anesthesia. Under sterile technique a 20g 3 1/2 in spinal needle with a preloaded visco syringe was passed through the injection site towards the suprapatellar space under live ultrasound guidance and noted to penetrate the joint capsule. The medication was injected without resistance under live ultrasound visualization and noted to flow into the suprapatellar joint space after aspiration of 60 cc clear straw colored fluid. The injection site was sterilely dressed, there was minimal blood loss.\par \par The patient tolerated this procedure without any complications done by myself. Images were recorded and saved.\par \par The patient has been advised that if they notice any worsening of symptoms or any problems to contact me and seek care from a qualified medical professional. The patient was instructed to ice the knee and take NSAID medication on an as needed basis if the patient feels discomfort.\par \par \par \par Followup injection [6 months]

## 2022-05-03 ENCOUNTER — APPOINTMENT (OUTPATIENT)
Dept: ORTHOPEDIC SURGERY | Facility: CLINIC | Age: 57
End: 2022-05-03
Payer: COMMERCIAL

## 2022-05-03 PROCEDURE — 73564 X-RAY EXAM KNEE 4 OR MORE: CPT | Mod: RT

## 2022-05-03 PROCEDURE — 99213 OFFICE O/P EST LOW 20 MIN: CPT

## 2022-05-03 RX ORDER — CLOTRIMAZOLE AND BETAMETHASONE DIPROPIONATE 10; .5 MG/G; MG/G
1-0.05 CREAM TOPICAL
Qty: 15 | Refills: 0 | Status: ACTIVE | COMMUNITY
Start: 2022-04-19

## 2022-05-03 RX ORDER — DICLOFENAC SODIUM 50 MG/1
50 TABLET, DELAYED RELEASE ORAL
Qty: 60 | Refills: 1 | Status: ACTIVE | COMMUNITY
Start: 2022-05-03 | End: 1900-01-01

## 2022-05-03 RX ORDER — HYALURONATE SODIUM, STABILIZED 60 MG/3 ML
60 SYRINGE (ML) INTRAARTICULAR
Qty: 3 | Refills: 0 | Status: ACTIVE | COMMUNITY
Start: 2022-02-28

## 2022-05-03 RX ORDER — CEPHALEXIN 500 MG/1
500 CAPSULE ORAL
Qty: 28 | Refills: 0 | Status: ACTIVE | COMMUNITY
Start: 2022-04-19

## 2022-05-03 NOTE — PHYSICAL EXAM
[de-identified] : Physical Examination\par General: well nourished, in no acute distress, alert and oriented to person, place and time\par Psychiatric: normal mood and affect, no abnormal movements or speech patterns\par Eyes: vision intact - glasses\par \par Musculoskeletal Examination\par Ambulation	+ antalgic gait, - assistive devices\par \par Knee			Right			Left\par General\par      Swelling/Deformity	normal			normal	\par      Skin			normal			normal\par      Erythema		-			-\par      Standing Alignment	varus			varus\par      Effusion		trace			trace\par Range of Motion\par      Hip			full painless ROM		full painless ROM\par      Knee Flexion		100			90\par      Knee Extension	                 0			0\par Patella\par      J Sign		-			-\par      Quad Medial/Lateral	1/1 1/1\par      Apprehension		-			-\par      Rj's		                +			+\par      Grind Sign		+			+\par      Crepitus		+			+\par Palpation\par      Medial Joint Line	                 -			+\par      Medial Fem Condyle	-			-\par      Lateral Joint Line	+			-\par      Quad Tendon		-			-\par      Patella Tendon	               -			-\par      Medial Patella		-			-\par      Lateral Patella 	                  -			-\par      Posterior Knee	                + lateral hamstring			+\par Ligamentous\par      Varus @ 0° / 30°	-/-			-/-\par      Valgus @ 0° / 30°	-/-			-/-\par Strength Examination/Atrophy\par      Hip Flexors 		5+			5+\par      Quadriceps		5+			5+\par      Hamstring		5+			5+\par      Tibialis Anterior	                5+			5+\par      Achilles/Soleus	               5+			5+\par Sensation\par      Deep Peroneal	normal			normal\par      Superficial Peroneal 	normal			normal\par      Sural  		normal			normal\par      Posterior Tibial 	normal			normal\par      Saphneous 		normal			normal\par Pulses\par      DP			2+			2+\par \par \par  [de-identified] : 5 views of the affected Left knee (standing AP, flexing standing AP, 30degree flexed lateral, 0degree lateral, sunrise view)\par 3-2019\par demonstrate:\par There is severe medial weightbearing asymmetric narrowing\par Small to moderate osteophytic lipping\par Small suprapatellar effusion\par Mild patellofemoral joint space loss without evidence of tilt [or] subluxation on sunrise view\par Normal soft tissue density\par Otherwise normal osseous bone structure without fracture or dislocation\par \par \par MRI Left knee from [facility] on 4-8-19\par My impression of the images:\par Quality of the MRI is poor\par Medial Meniscus extruded and degenerative tear\par Lateral Meniscus ok\par There is severe chondral loss in the medial greater than patellofemoral greater than compartments\par There is bone marrow edema[/subchondral cysts] in the medial and patellofemoral compartments\par LCL is intact\par MCL is intact\par ACL is intact\par PCL is intact \par Quadriceps Tendon is intact\par Patella Tendon is intact\par \par The Final Radiologist Impression:\par There is moderate to severe medial compartment arthrosis with severe joint space narrowing, broad area of full-thickness articular cartilage loss of the central weightbearing surface of the femoral condyle and tibial plateau with reactive marrow edema and marginal osteophytes. The medial meniscus is diffusely degenerated and torn and there is extrusion of the body segment.\par The lateral meniscus is intact without degenerative signal. There is mucoid degeneration of the anterior and posterior cruciate ligaments. The medial and lateral collateral ligament complex are intact. The extensor mechanism is intact.\par There is a lobulated intraosseous ganglion within the femoral attachment of the anterior cruciate ligament. There is heterogeneous marrow signal within the distal femur without infiltrative lesion. The articular cartilage of the lateral and patellofemoral compartments is maintained. There is a small joint effusion. There is no significant popliteal cyst. The para-articular musculature is unremarkable. There is subcutaneous soft tissue edema.\par IMPRESSION:\par Moderate to severe medial compartment arthrosis with diffusely degenerated and torn medial meniscus and extrusion of the body segment.\par \par 5 views of the affected Left knee (standing AP, flexing standing AP, 30degree flexed lateral, 0degree lateral, sunrise view)\par 10-21-19\par demonstrate:\par There is severe medial weightbearing asymmetric narrowing\par Small to moderate osteophytic lipping\par Small suprapatellar effusion\par Mild patellofemoral joint space loss without evidence of tilt [or] subluxation on sunrise view\par Normal soft tissue density\par Otherwise normal osseous bone structure without fracture or dislocation\par \par 5 views of the affected Left knee (standing AP, flexing standing AP, 30degree flexed lateral, 0degree lateral, sunrise view)\par 11-21-20\par demonstrate:\par There is severe medial weightbearing asymmetric narrowing\par Small to moderate osteophytic lipping\par Small suprapatellar effusion\par Mild patellofemoral joint space loss without evidence of tilt [or] subluxation on sunrise view\par Normal soft tissue density\par Otherwise normal osseous bone structure without fracture or dislocation\par \par 4 views of the affected Left knee (standing AP, flexing standing AP, 30degree flexed lateral, sunrise view)\par 5-1-21\par demonstrate:\par There is severe medial weightbearing asymmetric narrowing\par Small to moderate osteophytic lipping\par Small suprapatellar effusion\par Mild patellofemoral joint space loss without evidence of tilt [or] subluxation on sunrise view\par Normal soft tissue density\par new bone cyst appearing on tibia under medial tibial spine without fracture or dislocation\par \par 4 views of the affected Left knee (standing AP, flexing standing AP, 30degree flexed lateral, sunrise view)\par 2-8-22\par demonstrate:\par There is severe medial weightbearing asymmetric narrowing\par Small to moderate osteophytic lipping\par Small suprapatellar effusion\par Mild patellofemoral joint space loss without evidence of tilt [or] subluxation on sunrise view\par Normal soft tissue density\par unchanged bone cyst appearing on tibia under medial tibial spine without fracture or dislocation\par \par 4 views of the affected Right knee (standing AP, flexing standing AP, 30degree flexed lateral, sunrise view)\par were ordered, obtained and evaluated by myself today and\par demonstrate:\par There is severe medial weightbearing asymmetric narrowing\par Small to moderate osteophytic lipping\par Small suprapatellar effusion\par Mild patellofemoral joint space loss without evidence of tilt [or] subluxation on sunrise view\par Normal soft tissue density\par unchanged bone cyst appearing on tibia under medial tibial spine without fracture or dislocation

## 2022-05-03 NOTE — HISTORY OF PRESENT ILLNESS
[de-identified] : CC right knee\par \par HPI 57yo male right HD presents for  [chronic] onset of 1.5 weeks of constant pain in the posterior lateral right knee [without injury]. The pain is same, and rated a 8 out of 10, described as sharp, with radiation to the heel. [Rest] makes the pain better and [walking standing stairs] makes the pain worse. The patient reports associated symptoms of pop and click. The patient - pain at night affecting sleep, and - similar pain previously.\par \par The patient has tried the following treatments:\par Activity modification	+\par Ice/Compression  	+\par Braces    		-\par Nsaids    		+ Diclofenac some relief\par Physical Therapy 	+ \par Cortisone Injection	+ multiple left knee cortizone injections mild help\par Zilretta                     + multiple prior injections with good improvement but now not covered by insurance anymore\par Visco Injection		+ multiple prior injections with mild help but not covered by insurance anymore, last injection left knee durolane OOP 3-2022\par Arthroscopy		-\par \par \par \par Review of Systems is positive for the above musculoskeletal symptoms and is otherwise non-contributory for general, constitutional, psychiatric, neurologic, HEENT, cardiac, respiratory, gastrointestinal, reproductive, lymphatic, and dermatologic complaints.\par \par Consult by Dr Nicole Grossman\par \par

## 2022-05-03 NOTE — DISCUSSION/SUMMARY
[de-identified] : Left knee osteoarthritis\par Left knee complex medial meniscus tear with extrusion\par Right knee osteoarthritis\par Right knee hamstring strain\par \par The patient and I discussed the causes and progression of degenerative joint disease of the knee. Models, diagrams and drawings were used in the discussion. Treatment can include conservative non-operative management and surgical options. Conservative management includes weight loss, activity modification, physical therapy to improve motion and strength in the muscles around the knee and the body's core, PO and topical NSAIDs, corticosteroid and/or viscosupplementation intra-articular injections. If the patient fails to improve with non-operative management, surgical management is possible. Depending upon the patient's age, BMI, activity level, degree and location of arthrosis different surgical options are possible including arthroscopic debridement with chondroplasty, high-tibial osteotomy, unicondylar/partial arthroplasty, and total joint arthroplasty.\par \par discussed concern for bone cyst, increasing OA. no avn or bone collapse noted. concern for multiple cortisone inj expressed, recommend weightloss and consider TKA\par \par diclofenac\par \par PT\par \par The patient verifies their understanding the the visit, diagnosis and plan. They agree with the treatment plan and will contact the office with any questions or problems.\par \par Follow up\par PRN

## 2022-06-17 ENCOUNTER — APPOINTMENT (OUTPATIENT)
Dept: ORTHOPEDIC SURGERY | Facility: CLINIC | Age: 57
End: 2022-06-17
Payer: COMMERCIAL

## 2022-06-17 DIAGNOSIS — S76.311A STRAIN OF MUSCLE, FASCIA AND TENDON OF THE POSTERIOR MUSCLE GROUP AT THIGH LEVEL, RIGHT THIGH, INITIAL ENCOUNTER: ICD-10-CM

## 2022-06-17 PROCEDURE — 99213 OFFICE O/P EST LOW 20 MIN: CPT

## 2022-06-17 RX ORDER — CYCLOBENZAPRINE HYDROCHLORIDE 5 MG/1
5 TABLET, FILM COATED ORAL
Qty: 40 | Refills: 0 | Status: COMPLETED | COMMUNITY
Start: 2022-06-17 | End: 2022-07-27

## 2022-06-17 NOTE — DISCUSSION/SUMMARY
[de-identified] : Left knee osteoarthritis\par Left knee complex medial meniscus tear with extrusion\par Right knee osteoarthritis\par Right knee hamstring strain\par \par The patient and I discussed the causes and progression of degenerative joint disease of the knee. Models, diagrams and drawings were used in the discussion. Treatment can include conservative non-operative management and surgical options. Conservative management includes weight loss, activity modification, physical therapy to improve motion and strength in the muscles around the knee and the body's core, PO and topical NSAIDs, corticosteroid and/or viscosupplementation intra-articular injections. If the patient fails to improve with non-operative management, surgical management is possible. Depending upon the patient's age, BMI, activity level, degree and location of arthrosis different surgical options are possible including arthroscopic debridement with chondroplasty, high-tibial osteotomy, unicondylar/partial arthroplasty, and total joint arthroplasty.\par \par MRI rt knee for continued hamstring pain\par \par The patient verifies their understanding the the visit, diagnosis and plan. They agree with the treatment plan and will contact the office with any questions or problems.\par \par Follow up\par after nmri

## 2022-06-17 NOTE — HISTORY OF PRESENT ILLNESS
[de-identified] : CC right + left knee\par \par HPI 57yo male right HD presents for fu of [chronic] onset of 1.5 weeks of constant pain in the posterior lateral right and medial left knee [without injury]. The pain is same, and rated a 8 out of 10, described as sharp, with radiation to the heel. [Rest] makes the pain better and [walking standing stairs] makes the pain worse. The patient reports associated symptoms of pop and click. The patient - pain at night affecting sleep, and - similar pain previously.\par \par The patient has tried the following treatments:\par Activity modification	+\par Ice/Compression  	+\par Braces    		-\par Nsaids    		+ Diclofenac some relief\par Physical Therapy 	+ \par Cortisone Injection	+ multiple left knee cortizone injections mild help\par Zilretta                     + multiple prior injections with good improvement but now not covered by insurance anymore\par Visco Injection		+ multiple prior injections with mild help but not covered by insurance anymore, last injection left knee durolane OOP 3-2022\par Arthroscopy		-\par \par \par \par Review of Systems is positive for the above musculoskeletal symptoms and is otherwise non-contributory for general, constitutional, psychiatric, neurologic, HEENT, cardiac, respiratory, gastrointestinal, reproductive, lymphatic, and dermatologic complaints.\par \par Consult by Dr Nicole Grossman\par

## 2022-06-17 NOTE — PHYSICAL EXAM
[de-identified] : Physical Examination\par General: well nourished, in no acute distress, alert and oriented to person, place and time\par Psychiatric: normal mood and affect, no abnormal movements or speech patterns\par Eyes: vision intact - glasses\par \par Musculoskeletal Examination\par Ambulation	+ antalgic gait, - assistive devices\par \par Knee			Right			Left\par General\par      Swelling/Deformity	normal			normal	\par      Skin			normal			normal\par      Erythema		-			-\par      Standing Alignment	varus			varus\par      Effusion		trace			trace\par Range of Motion\par      Hip			full painless ROM		full painless ROM\par      Knee Flexion		100			90\par      Knee Extension	                 0			0\par Patella\par      J Sign		-			-\par      Quad Medial/Lateral	1/1 1/1\par      Apprehension		-			-\par      Jr's		                +			+\par      Grind Sign		+			+\par      Crepitus		+			+\par Palpation\par      Medial Joint Line	                 -			+\par      Medial Fem Condyle	-			-\par      Lateral Joint Line	+			-\par      Quad Tendon		-			-\par      Patella Tendon	               -			-\par      Medial Patella		-			-\par      Lateral Patella 	                  -			-\par      Posterior Knee	                + lateral hamstring			+\par Ligamentous\par      Varus @ 0° / 30°	-/-			-/-\par      Valgus @ 0° / 30°	-/-			-/-\par Strength Examination/Atrophy\par      Hip Flexors 		5+			5+\par      Quadriceps		5+			5+\par      Hamstring		5+			5+\par      Tibialis Anterior	                5+			5+\par      Achilles/Soleus	               5+			5+\par Sensation\par      Deep Peroneal	normal			normal\par      Superficial Peroneal 	normal			normal\par      Sural  		normal			normal\par      Posterior Tibial 	normal			normal\par      Saphneous 		normal			normal\par Pulses\par      DP			2+			2+\par \par \par  [de-identified] : 5 views of the affected Left knee (standing AP, flexing standing AP, 30degree flexed lateral, 0degree lateral, sunrise view)\par 3-2019\par demonstrate:\par There is severe medial weightbearing asymmetric narrowing\par Small to moderate osteophytic lipping\par Small suprapatellar effusion\par Mild patellofemoral joint space loss without evidence of tilt [or] subluxation on sunrise view\par Normal soft tissue density\par Otherwise normal osseous bone structure without fracture or dislocation\par \par \par MRI Left knee [facility] on 4-8-19\par My impression of the images:\par Quality of the MRI is poor\par Medial Meniscus extruded and degenerative tear\par Lateral Meniscus ok\par There is severe chondral loss in the medial greater than patellofemoral greater than compartments\par There is bone marrow edema[/subchondral cysts] in the medial and patellofemoral compartments\par LCL is intact\par MCL is intact\par ACL is intact\par PCL is intact \par Quadriceps Tendon is intact\par Patella Tendon is intact\par \par The Final Radiologist Impression:\par There is moderate to severe medial compartment arthrosis with severe joint space narrowing, broad area of full-thickness articular cartilage loss of the central weightbearing surface of the femoral condyle and tibial plateau with reactive marrow edema and marginal osteophytes. The medial meniscus is diffusely degenerated and torn and there is extrusion of the body segment.\par The lateral meniscus is intact without degenerative signal. There is mucoid degeneration of the anterior and posterior cruciate ligaments. The medial and lateral collateral ligament complex are intact. The extensor mechanism is intact.\par There is a lobulated intraosseous ganglion within the femoral attachment of the anterior cruciate ligament. There is heterogeneous marrow signal within the distal femur without infiltrative lesion. The articular cartilage of the lateral and patellofemoral compartments is maintained. There is a small joint effusion. There is no significant popliteal cyst. The para-articular musculature is unremarkable. There is subcutaneous soft tissue edema.\par IMPRESSION:\par Moderate to severe medial compartment arthrosis with diffusely degenerated and torn medial meniscus and extrusion of the body segment.\par \par 5 views of the affected Left knee (standing AP, flexing standing AP, 30degree flexed lateral, 0degree lateral, sunrise view)\par 10-21-19\par demonstrate:\par There is severe medial weightbearing asymmetric narrowing\par Small to moderate osteophytic lipping\par Small suprapatellar effusion\par Mild patellofemoral joint space loss without evidence of tilt [or] subluxation on sunrise view\par Normal soft tissue density\par Otherwise normal osseous bone structure without fracture or dislocation\par \par 5 views of the affected Left knee (standing AP, flexing standing AP, 30degree flexed lateral, 0degree lateral, sunrise view)\par 11-21-20\par demonstrate:\par There is severe medial weightbearing asymmetric narrowing\par Small to moderate osteophytic lipping\par Small suprapatellar effusion\par Mild patellofemoral joint space loss without evidence of tilt [or] subluxation on sunrise view\par Normal soft tissue density\par Otherwise normal osseous bone structure without fracture or dislocation\par \par 4 views of the affected Left knee (standing AP, flexing standing AP, 30degree flexed lateral, sunrise view)\par 5-1-21\par demonstrate:\par There is severe medial weightbearing asymmetric narrowing\par Small to moderate osteophytic lipping\par Small suprapatellar effusion\par Mild patellofemoral joint space loss without evidence of tilt [or] subluxation on sunrise view\par Normal soft tissue density\par new bone cyst appearing on tibia under medial tibial spine without fracture or dislocation\par \par 4 views of the affected Left knee (standing AP, flexing standing AP, 30degree flexed lateral, sunrise view)\par 2-8-22\par demonstrate:\par There is severe medial weightbearing asymmetric narrowing\par Small to moderate osteophytic lipping\par Small suprapatellar effusion\par Mild patellofemoral joint space loss without evidence of tilt [or] subluxation on sunrise view\par Normal soft tissue density\par unchanged bone cyst appearing on tibia under medial tibial spine without fracture or dislocation\par \par 4 views of the affected Right knee (standing AP, flexing standing AP, 30degree flexed lateral, sunrise view)\par were ordered, obtained and evaluated by myself today and\par demonstrate:\par There is severe medial weightbearing asymmetric narrowing\par Small to moderate osteophytic lipping\par Small suprapatellar effusion\par Mild patellofemoral joint space loss without evidence of tilt [or] subluxation on sunrise view\par Normal soft tissue density\par unchanged bone cyst appearing on tibia under medial tibial spine without fracture or dislocation

## 2022-07-25 ENCOUNTER — APPOINTMENT (OUTPATIENT)
Dept: ORTHOPEDIC SURGERY | Facility: CLINIC | Age: 57
End: 2022-07-25

## 2022-07-25 PROCEDURE — 99213 OFFICE O/P EST LOW 20 MIN: CPT

## 2022-07-25 RX ORDER — HYLAN G-F 20 16MG/2ML
16 SYRINGE (ML) INTRAARTICULAR
Qty: 1 | Refills: 0 | Status: ACTIVE | COMMUNITY
Start: 2022-07-25 | End: 1900-01-01

## 2022-07-25 RX ORDER — MELOXICAM 15 MG/1
15 TABLET ORAL
Qty: 30 | Refills: 2 | Status: ACTIVE | COMMUNITY
Start: 2022-06-17 | End: 1900-01-01

## 2022-08-18 ENCOUNTER — APPOINTMENT (OUTPATIENT)
Dept: ORTHOPEDIC SURGERY | Facility: CLINIC | Age: 57
End: 2022-08-18

## 2022-08-18 PROCEDURE — 20610 DRAIN/INJ JOINT/BURSA W/O US: CPT

## 2022-08-18 RX ORDER — HYLAN G-F 20 16MG/2ML
16 SYRINGE (ML) INTRAARTICULAR
Qty: 1 | Refills: 0 | Status: ACTIVE | COMMUNITY
Start: 2022-08-18

## 2022-08-29 ENCOUNTER — APPOINTMENT (OUTPATIENT)
Dept: ORTHOPEDIC SURGERY | Facility: CLINIC | Age: 57
End: 2022-08-29

## 2022-08-29 PROCEDURE — 20610 DRAIN/INJ JOINT/BURSA W/O US: CPT

## 2022-10-03 ENCOUNTER — APPOINTMENT (OUTPATIENT)
Dept: ORTHOPEDIC SURGERY | Facility: CLINIC | Age: 57
End: 2022-10-03

## 2022-10-03 PROCEDURE — 20610 DRAIN/INJ JOINT/BURSA W/O US: CPT

## 2023-03-20 ENCOUNTER — APPOINTMENT (OUTPATIENT)
Dept: ORTHOPEDIC SURGERY | Facility: CLINIC | Age: 58
End: 2023-03-20
Payer: COMMERCIAL

## 2023-03-20 VITALS
DIASTOLIC BLOOD PRESSURE: 88 MMHG | SYSTOLIC BLOOD PRESSURE: 128 MMHG | BODY MASS INDEX: 42.66 KG/M2 | WEIGHT: 315 LBS | HEIGHT: 72 IN

## 2023-03-20 PROCEDURE — 99212 OFFICE O/P EST SF 10 MIN: CPT

## 2023-04-24 ENCOUNTER — APPOINTMENT (OUTPATIENT)
Dept: ORTHOPEDIC SURGERY | Facility: CLINIC | Age: 58
End: 2023-04-24
Payer: COMMERCIAL

## 2023-04-24 PROCEDURE — 20610 DRAIN/INJ JOINT/BURSA W/O US: CPT

## 2023-05-01 ENCOUNTER — APPOINTMENT (OUTPATIENT)
Dept: ORTHOPEDIC SURGERY | Facility: CLINIC | Age: 58
End: 2023-05-01
Payer: SELF-PAY

## 2023-05-01 PROCEDURE — 20610 DRAIN/INJ JOINT/BURSA W/O US: CPT

## 2023-11-13 ENCOUNTER — APPOINTMENT (OUTPATIENT)
Dept: ORTHOPEDIC SURGERY | Facility: CLINIC | Age: 58
End: 2023-11-13
Payer: COMMERCIAL

## 2023-11-13 DIAGNOSIS — M17.0 BILATERAL PRIMARY OSTEOARTHRITIS OF KNEE: ICD-10-CM

## 2023-11-13 PROCEDURE — 99213 OFFICE O/P EST LOW 20 MIN: CPT

## 2023-11-15 PROBLEM — M17.0 PRIMARY OSTEOARTHRITIS OF BOTH KNEES: Status: ACTIVE | Noted: 2022-06-17

## 2023-12-04 ENCOUNTER — APPOINTMENT (OUTPATIENT)
Dept: ORTHOPEDIC SURGERY | Facility: CLINIC | Age: 58
End: 2023-12-04

## 2023-12-04 RX ORDER — HYLAN G-F 20 16MG/2ML
48 SYRINGE (ML) INTRAARTICULAR
Qty: 2 | Refills: 0 | Status: ACTIVE | COMMUNITY
Start: 2023-12-04

## 2023-12-18 ENCOUNTER — APPOINTMENT (OUTPATIENT)
Dept: ORTHOPEDIC SURGERY | Facility: CLINIC | Age: 58
End: 2023-12-18
Payer: COMMERCIAL

## 2023-12-18 VITALS — HEIGHT: 72 IN | BODY MASS INDEX: 42.66 KG/M2 | WEIGHT: 315 LBS

## 2023-12-18 DIAGNOSIS — M17.12 UNILATERAL PRIMARY OSTEOARTHRITIS, LEFT KNEE: ICD-10-CM

## 2023-12-18 PROCEDURE — 20610 DRAIN/INJ JOINT/BURSA W/O US: CPT | Mod: LT

## 2024-01-08 ENCOUNTER — APPOINTMENT (OUTPATIENT)
Dept: ORTHOPEDIC SURGERY | Facility: CLINIC | Age: 59
End: 2024-01-08
Payer: COMMERCIAL

## 2024-01-08 VITALS — BODY MASS INDEX: 42.66 KG/M2 | HEIGHT: 72 IN | WEIGHT: 315 LBS

## 2024-01-08 DIAGNOSIS — M17.11 UNILATERAL PRIMARY OSTEOARTHRITIS, RIGHT KNEE: ICD-10-CM

## 2024-01-08 PROCEDURE — 20610 DRAIN/INJ JOINT/BURSA W/O US: CPT | Mod: RT

## 2024-11-11 ENCOUNTER — APPOINTMENT (OUTPATIENT)
Dept: ORTHOPEDIC SURGERY | Facility: CLINIC | Age: 59
End: 2024-11-11
Payer: COMMERCIAL

## 2024-11-11 DIAGNOSIS — M25.572 PAIN IN LEFT ANKLE AND JOINTS OF LEFT FOOT: ICD-10-CM

## 2024-11-11 DIAGNOSIS — M17.0 BILATERAL PRIMARY OSTEOARTHRITIS OF KNEE: ICD-10-CM

## 2024-11-11 PROCEDURE — 99214 OFFICE O/P EST MOD 30 MIN: CPT | Mod: 25

## 2024-11-11 PROCEDURE — 73610 X-RAY EXAM OF ANKLE: CPT | Mod: LT

## 2024-11-11 PROCEDURE — 73564 X-RAY EXAM KNEE 4 OR MORE: CPT | Mod: 50

## 2024-11-11 PROCEDURE — 20610 DRAIN/INJ JOINT/BURSA W/O US: CPT | Mod: 50

## 2024-11-11 RX ORDER — LIDOCAINE HYDROCHLORIDE 10 MG/ML
1 INJECTION, SOLUTION INFILTRATION; PERINEURAL
Refills: 0 | Status: COMPLETED | OUTPATIENT
Start: 2024-11-11

## 2024-11-11 RX ORDER — METHYLPRED ACET/NACL,ISO-OS/PF 40 MG/ML
40 VIAL (ML) INJECTION
Refills: 0 | Status: COMPLETED | OUTPATIENT
Start: 2024-11-11

## 2024-11-11 RX ADMIN — METHYLPREDNISOLONE ACETATE 1 MG/ML: 40 INJECTION, SUSPENSION INTRA-ARTICULAR; INTRALESIONAL; INTRAMUSCULAR; SOFT TISSUE at 00:00

## 2024-11-11 RX ADMIN — LIDOCAINE HYDROCHLORIDE 2 %: 10 INJECTION, SOLUTION INFILTRATION; PERINEURAL at 00:00

## 2025-06-09 ENCOUNTER — APPOINTMENT (OUTPATIENT)
Dept: ORTHOPEDIC SURGERY | Facility: CLINIC | Age: 60
End: 2025-06-09
Payer: COMMERCIAL

## 2025-06-09 PROCEDURE — 20610 DRAIN/INJ JOINT/BURSA W/O US: CPT | Mod: 50

## 2025-06-09 PROCEDURE — 99214 OFFICE O/P EST MOD 30 MIN: CPT | Mod: 25
